# Patient Record
Sex: MALE | Race: WHITE | NOT HISPANIC OR LATINO | Employment: OTHER | URBAN - METROPOLITAN AREA
[De-identification: names, ages, dates, MRNs, and addresses within clinical notes are randomized per-mention and may not be internally consistent; named-entity substitution may affect disease eponyms.]

---

## 2023-04-06 LAB
ANION GAP IN SER/PLAS: 16 MMOL/L (ref 10–20)
CALCIUM (MG/DL) IN SER/PLAS: 9.5 MG/DL (ref 8.6–10.6)
CARBON DIOXIDE, TOTAL (MMOL/L) IN SER/PLAS: 26 MMOL/L (ref 21–32)
CHLORIDE (MMOL/L) IN SER/PLAS: 104 MMOL/L (ref 98–107)
CREATININE (MG/DL) IN SER/PLAS: 1.3 MG/DL (ref 0.5–1.3)
ESTIMATED AVERAGE GLUCOSE FOR HBA1C: 148 MG/DL
GFR MALE: 55 ML/MIN/1.73M2
GLUCOSE (MG/DL) IN SER/PLAS: 127 MG/DL (ref 74–99)
HEMOGLOBIN A1C/HEMOGLOBIN TOTAL IN BLOOD: 6.8 %
POTASSIUM (MMOL/L) IN SER/PLAS: 4.6 MMOL/L (ref 3.5–5.3)
PROSTATE SPECIFIC AG (NG/ML) IN SER/PLAS: 1.82 NG/ML (ref 0–4)
SODIUM (MMOL/L) IN SER/PLAS: 141 MMOL/L (ref 136–145)
UREA NITROGEN (MG/DL) IN SER/PLAS: 28 MG/DL (ref 6–23)

## 2023-08-24 PROBLEM — R35.1 NOCTURIA: Status: ACTIVE | Noted: 2023-08-24

## 2023-08-24 PROBLEM — I49.3 PVC'S (PREMATURE VENTRICULAR CONTRACTIONS): Status: ACTIVE | Noted: 2023-08-24

## 2023-08-24 PROBLEM — E11.9 DIABETES MELLITUS (MULTI): Status: ACTIVE | Noted: 2023-08-24

## 2023-08-24 PROBLEM — A49.9 BACTERIAL UTI: Status: ACTIVE | Noted: 2023-08-24

## 2023-08-24 PROBLEM — L70.8 FOLLICULAR ACNE: Status: ACTIVE | Noted: 2023-08-24

## 2023-08-24 PROBLEM — E03.9 ACQUIRED HYPOTHYROIDISM: Status: ACTIVE | Noted: 2023-08-24

## 2023-08-24 PROBLEM — I10 HYPERTENSION: Status: ACTIVE | Noted: 2023-08-24

## 2023-08-24 PROBLEM — L73.9 FOLLICULITIS: Status: ACTIVE | Noted: 2023-08-24

## 2023-08-24 PROBLEM — N40.1 BPH ASSOCIATED WITH NOCTURIA: Status: ACTIVE | Noted: 2023-08-24

## 2023-08-24 PROBLEM — E78.5 HYPERLIPIDEMIA: Status: ACTIVE | Noted: 2023-08-24

## 2023-08-24 PROBLEM — R00.2 HEART PALPITATIONS: Status: ACTIVE | Noted: 2023-08-24

## 2023-08-24 PROBLEM — R94.4 DECREASED GFR: Status: ACTIVE | Noted: 2023-08-24

## 2023-08-24 PROBLEM — I25.10 CAD IN NATIVE ARTERY: Status: ACTIVE | Noted: 2023-08-24

## 2023-08-24 PROBLEM — N39.0 BACTERIAL UTI: Status: ACTIVE | Noted: 2023-08-24

## 2023-08-24 PROBLEM — I48.91 ATRIAL FIBRILLATION (MULTI): Status: ACTIVE | Noted: 2023-08-24

## 2023-08-24 PROBLEM — K57.90 DIVERTICULOSIS: Status: ACTIVE | Noted: 2023-08-24

## 2023-08-24 PROBLEM — K92.2 LOWER GI BLEED: Status: ACTIVE | Noted: 2023-08-24

## 2023-08-24 PROBLEM — N41.1 CHRONIC PROSTATITIS: Status: ACTIVE | Noted: 2023-08-24

## 2023-08-24 PROBLEM — R35.1 BPH ASSOCIATED WITH NOCTURIA: Status: ACTIVE | Noted: 2023-08-24

## 2023-08-24 PROBLEM — L98.9 SKIN DISORDER: Status: ACTIVE | Noted: 2023-08-24

## 2023-08-24 PROBLEM — N41.0 ACUTE BACTERIAL PROSTATITIS: Status: ACTIVE | Noted: 2023-08-24

## 2023-08-24 PROBLEM — Z98.62 STATUS POST ANGIOPLASTY: Status: ACTIVE | Noted: 2023-08-24

## 2023-08-24 PROBLEM — I50.9 CHF (CONGESTIVE HEART FAILURE) (MULTI): Status: ACTIVE | Noted: 2023-08-24

## 2023-08-24 RX ORDER — TRIAMCINOLONE ACETONIDE 5 MG/G
CREAM TOPICAL
COMMUNITY
Start: 2013-09-04 | End: 2024-04-23 | Stop reason: SDUPTHER

## 2023-08-24 RX ORDER — DESONIDE 0.5 MG/G
CREAM TOPICAL 2 TIMES DAILY
COMMUNITY
Start: 2013-09-04 | End: 2023-10-05 | Stop reason: SDUPTHER

## 2023-08-24 RX ORDER — LEVOTHYROXINE SODIUM 25 UG/1
1 TABLET ORAL DAILY
COMMUNITY
Start: 2022-10-06 | End: 2024-05-07 | Stop reason: SDUPTHER

## 2023-08-24 RX ORDER — FUROSEMIDE 40 MG/1
1 TABLET ORAL DAILY
COMMUNITY
Start: 2021-02-26 | End: 2024-04-02 | Stop reason: SDUPTHER

## 2023-08-24 RX ORDER — NITROGLYCERIN 0.4 MG/1
TABLET SUBLINGUAL
COMMUNITY
Start: 2013-09-04 | End: 2024-05-08 | Stop reason: SDUPTHER

## 2023-08-24 RX ORDER — METOPROLOL TARTRATE 50 MG/1
1 TABLET ORAL 2 TIMES DAILY
COMMUNITY
Start: 2015-09-11 | End: 2024-04-02 | Stop reason: SDUPTHER

## 2023-08-24 RX ORDER — LOSARTAN POTASSIUM 50 MG/1
1 TABLET ORAL DAILY
COMMUNITY
Start: 2018-10-03 | End: 2024-04-02 | Stop reason: SDUPTHER

## 2023-08-24 RX ORDER — AZELASTINE 1 MG/ML
SPRAY, METERED NASAL
COMMUNITY
Start: 2013-09-04 | End: 2024-04-04 | Stop reason: SDUPTHER

## 2023-08-24 RX ORDER — CLOBETASOL PROPIONATE 0.5 MG/G
CREAM TOPICAL 2 TIMES DAILY
COMMUNITY
Start: 2019-04-03 | End: 2024-05-07 | Stop reason: SDUPTHER

## 2023-08-24 RX ORDER — CETIRIZINE HYDROCHLORIDE 10 MG/1
1 TABLET ORAL DAILY
COMMUNITY
Start: 2015-09-01

## 2023-08-24 RX ORDER — DAPAGLIFLOZIN 5 MG/1
1 TABLET, FILM COATED ORAL DAILY
COMMUNITY
Start: 2021-12-20 | End: 2024-04-04 | Stop reason: SDUPTHER

## 2023-08-24 RX ORDER — ATORVASTATIN CALCIUM 80 MG/1
1 TABLET, FILM COATED ORAL NIGHTLY
COMMUNITY
Start: 2013-09-04 | End: 2024-04-02 | Stop reason: SDUPTHER

## 2023-08-24 RX ORDER — CLINDAMYCIN PHOSPHATE 10 UG/ML
LOTION TOPICAL
COMMUNITY
Start: 2019-10-02

## 2023-08-24 RX ORDER — ASPIRIN 81 MG/1
1 TABLET ORAL DAILY
COMMUNITY
Start: 2014-06-10 | End: 2024-04-02 | Stop reason: SDUPTHER

## 2023-08-24 RX ORDER — NEOMYCIN SULFATE, POLYMYXIN B SULFATE, HYDROCORTISONE 3.5; 10000; 1 MG/ML; [USP'U]/ML; MG/ML
SOLUTION/ DROPS AURICULAR (OTIC)
COMMUNITY
Start: 2020-07-27 | End: 2024-04-04 | Stop reason: SDUPTHER

## 2023-08-24 RX ORDER — ERYTHROMYCIN 20 MG/ML
SOLUTION TOPICAL 2 TIMES DAILY
COMMUNITY
Start: 2019-04-03

## 2023-08-24 RX ORDER — POTASSIUM CHLORIDE 20 MEQ/1
1 TABLET, EXTENDED RELEASE ORAL DAILY
COMMUNITY
Start: 2020-09-30 | End: 2024-04-02 | Stop reason: SDUPTHER

## 2023-08-24 RX ORDER — TAMSULOSIN HYDROCHLORIDE 0.4 MG/1
1 CAPSULE ORAL DAILY
COMMUNITY
Start: 2015-02-20 | End: 2024-04-23 | Stop reason: SDUPTHER

## 2023-08-24 RX ORDER — VIT A/VIT C/VIT E/ZINC/COPPER 2148-113
1 TABLET ORAL 2 TIMES DAILY
COMMUNITY
Start: 2020-03-05

## 2023-08-24 RX ORDER — METFORMIN HYDROCHLORIDE 500 MG/1
TABLET, EXTENDED RELEASE ORAL
COMMUNITY
Start: 2017-04-19 | End: 2024-04-04 | Stop reason: SDUPTHER

## 2023-08-24 RX ORDER — ADAPALENE 0.1 G/100G
CREAM TOPICAL
COMMUNITY
Start: 2019-10-02 | End: 2024-05-07 | Stop reason: SDUPTHER

## 2023-08-24 RX ORDER — CLOTRIMAZOLE AND BETAMETHASONE DIPROPIONATE 10; .64 MG/G; MG/G
CREAM TOPICAL
COMMUNITY
Start: 2013-09-04 | End: 2024-04-04 | Stop reason: SDUPTHER

## 2023-09-29 LAB
ALANINE AMINOTRANSFERASE (SGPT) (U/L) IN SER/PLAS: 22 U/L (ref 10–52)
ALBUMIN (G/DL) IN SER/PLAS: 4 G/DL (ref 3.4–5)
ALKALINE PHOSPHATASE (U/L) IN SER/PLAS: 80 U/L (ref 33–136)
ANION GAP IN SER/PLAS: 11 MMOL/L (ref 10–20)
ASPARTATE AMINOTRANSFERASE (SGOT) (U/L) IN SER/PLAS: 18 U/L (ref 9–39)
BILIRUBIN TOTAL (MG/DL) IN SER/PLAS: 0.8 MG/DL (ref 0–1.2)
CALCIUM (MG/DL) IN SER/PLAS: 9.4 MG/DL (ref 8.6–10.3)
CARBON DIOXIDE, TOTAL (MMOL/L) IN SER/PLAS: 30 MMOL/L (ref 21–32)
CHLORIDE (MMOL/L) IN SER/PLAS: 103 MMOL/L (ref 98–107)
CHOLESTEROL (MG/DL) IN SER/PLAS: 135 MG/DL (ref 0–199)
CHOLESTEROL IN HDL (MG/DL) IN SER/PLAS: 50.5 MG/DL
CHOLESTEROL/HDL RATIO: 2.7
CREATININE (MG/DL) IN SER/PLAS: 1.22 MG/DL (ref 0.5–1.3)
ERYTHROCYTE DISTRIBUTION WIDTH (RATIO) BY AUTOMATED COUNT: 13.4 % (ref 11.5–14.5)
ERYTHROCYTE MEAN CORPUSCULAR HEMOGLOBIN CONCENTRATION (G/DL) BY AUTOMATED: 32.9 G/DL (ref 32–36)
ERYTHROCYTE MEAN CORPUSCULAR VOLUME (FL) BY AUTOMATED COUNT: 99 FL (ref 80–100)
ERYTHROCYTES (10*6/UL) IN BLOOD BY AUTOMATED COUNT: 4.87 X10E12/L (ref 4.5–5.9)
GFR MALE: 59 ML/MIN/1.73M2
GLUCOSE (MG/DL) IN SER/PLAS: 128 MG/DL (ref 74–99)
HEMATOCRIT (%) IN BLOOD BY AUTOMATED COUNT: 48.3 % (ref 41–52)
HEMOGLOBIN (G/DL) IN BLOOD: 15.9 G/DL (ref 13.5–17.5)
LDL: 54 MG/DL (ref 0–99)
LEUKOCYTES (10*3/UL) IN BLOOD BY AUTOMATED COUNT: 6.9 X10E9/L (ref 4.4–11.3)
PLATELETS (10*3/UL) IN BLOOD AUTOMATED COUNT: 266 X10E9/L (ref 150–450)
POTASSIUM (MMOL/L) IN SER/PLAS: 4 MMOL/L (ref 3.5–5.3)
PROTEIN TOTAL: 7.1 G/DL (ref 6.4–8.2)
SODIUM (MMOL/L) IN SER/PLAS: 140 MMOL/L (ref 136–145)
THYROTROPIN (MIU/L) IN SER/PLAS BY DETECTION LIMIT <= 0.05 MIU/L: 2.46 MIU/L (ref 0.44–3.98)
TRIGLYCERIDE (MG/DL) IN SER/PLAS: 153 MG/DL (ref 0–149)
UREA NITROGEN (MG/DL) IN SER/PLAS: 23 MG/DL (ref 6–23)
VLDL: 31 MG/DL (ref 0–40)

## 2023-09-30 LAB
ESTIMATED AVERAGE GLUCOSE FOR HBA1C: 143 MG/DL
HEMOGLOBIN A1C/HEMOGLOBIN TOTAL IN BLOOD: 6.6 %

## 2023-10-03 ENCOUNTER — OFFICE VISIT (OUTPATIENT)
Dept: CARDIOLOGY | Facility: HOSPITAL | Age: 83
End: 2023-10-03
Payer: MEDICARE

## 2023-10-03 VITALS
HEIGHT: 73 IN | BODY MASS INDEX: 31.28 KG/M2 | SYSTOLIC BLOOD PRESSURE: 105 MMHG | WEIGHT: 236 LBS | DIASTOLIC BLOOD PRESSURE: 66 MMHG | HEART RATE: 60 BPM

## 2023-10-03 DIAGNOSIS — I48.91 ATRIAL FIBRILLATION, UNSPECIFIED TYPE (MULTI): Primary | ICD-10-CM

## 2023-10-03 DIAGNOSIS — I50.32 CHRONIC DIASTOLIC CONGESTIVE HEART FAILURE (MULTI): ICD-10-CM

## 2023-10-03 DIAGNOSIS — E11.9 TYPE 2 DIABETES MELLITUS WITHOUT COMPLICATION, WITHOUT LONG-TERM CURRENT USE OF INSULIN (MULTI): ICD-10-CM

## 2023-10-03 DIAGNOSIS — N40.1 BPH ASSOCIATED WITH NOCTURIA: ICD-10-CM

## 2023-10-03 DIAGNOSIS — I25.10 CAD IN NATIVE ARTERY: ICD-10-CM

## 2023-10-03 DIAGNOSIS — R35.1 BPH ASSOCIATED WITH NOCTURIA: ICD-10-CM

## 2023-10-03 DIAGNOSIS — E78.00 PURE HYPERCHOLESTEROLEMIA: ICD-10-CM

## 2023-10-03 DIAGNOSIS — I10 PRIMARY HYPERTENSION: ICD-10-CM

## 2023-10-03 PROCEDURE — 93005 ELECTROCARDIOGRAM TRACING: CPT | Performed by: INTERNAL MEDICINE

## 2023-10-03 PROCEDURE — 3074F SYST BP LT 130 MM HG: CPT | Performed by: INTERNAL MEDICINE

## 2023-10-03 PROCEDURE — 99214 OFFICE O/P EST MOD 30 MIN: CPT | Performed by: INTERNAL MEDICINE

## 2023-10-03 PROCEDURE — 3078F DIAST BP <80 MM HG: CPT | Performed by: INTERNAL MEDICINE

## 2023-10-03 PROCEDURE — 1036F TOBACCO NON-USER: CPT | Performed by: INTERNAL MEDICINE

## 2023-10-03 PROCEDURE — 1159F MED LIST DOCD IN RCRD: CPT | Performed by: INTERNAL MEDICINE

## 2023-10-03 PROCEDURE — 1160F RVW MEDS BY RX/DR IN RCRD: CPT | Performed by: INTERNAL MEDICINE

## 2023-10-03 ASSESSMENT — ENCOUNTER SYMPTOMS
LOSS OF SENSATION IN FEET: 0
DEPRESSION: 0
OCCASIONAL FEELINGS OF UNSTEADINESS: 1

## 2023-10-03 ASSESSMENT — PATIENT HEALTH QUESTIONNAIRE - PHQ9
2. FEELING DOWN, DEPRESSED OR HOPELESS: NOT AT ALL
1. LITTLE INTEREST OR PLEASURE IN DOING THINGS: NOT AT ALL
SUM OF ALL RESPONSES TO PHQ9 QUESTIONS 1 AND 2: 0

## 2023-10-03 NOTE — PROGRESS NOTES
"Chief Complaint:   Hyperlipidemia, Heart Failure, Coronary Artery Disease, Atrial Fibrillation, and Fatigue     History Of Present Illness:    Stan Carrero is a 83 y.o. male presents for follow-up.  He has no particular complaints.  He is able to walk up 1 flight of stairs and do yard work without chest pain or shortness of breath.  The patient denies chest pain, shortness of breath, palpitations, lightheadedness, syncope, orthopnea, paroxysmal nocturnal dyspnea, lower extremity edema, or bleeding problems.     Last Recorded Vitals:  Vitals:    10/03/23 1403   BP: 105/66   BP Location: Left arm   Patient Position: Sitting   BP Cuff Size: Adult   Pulse: 60   Weight: 107 kg (236 lb)   Height: 1.854 m (6' 1\")       Past Medical History:  He has a past medical history of Chronic sinusitis, unspecified, Other conditions influencing health status, and Personal history of other diseases of urinary system.    Past Surgical History:  He has a past surgical history that includes Appendectomy (06/17/2013); Other surgical history (09/04/2013); and Cataract extraction (09/04/2013).      Social History:  He reports that he has never smoked. He has never used smokeless tobacco. No history on file for alcohol use and drug use.    Family History:  Family History   Problem Relation Name Age of Onset    Alzheimer's disease Mother      COPD Father          Allergies:  Lisinopril and Shellfish derived    Outpatient Medications:  Current Outpatient Medications   Medication Instructions    adapalene (Differin) 0.1 % cream Topical, Daily RT    apixaban (Eliquis) 5 mg tablet 1 tablet, oral, 2 times daily    aspirin 81 mg EC tablet 1 tablet, oral, Daily    atorvastatin (Lipitor) 80 mg tablet 1 tablet, oral, Nightly    azelastine (Astelin) 137 mcg (0.1 %) nasal spray nasal    carboxymethylcellulose PF 1 % ophthalmic solution ophthalmic (eye)    cetirizine (ZyrTEC) 10 mg tablet 1 tablet, oral, Daily    clindamycin (Cleocin T) 1 % lotion " APPLY SPARINGLY AND MASSAGE IN AS DIRECTED    clobetasol (Temovate) 0.05 % cream 2 times daily    clotrimazole-betamethasone (Lotrisone) cream apply twice a day as directed    dapagliflozin propanediol (Farxiga) 5 mg 1 tablet, oral, Daily    desonide (DesOwen) 0.05 % cream 2 times daily    erythromycin with Ethanol (Theramycin) 2 % external solution 2 times daily    furosemide (Lasix) 40 mg tablet 1 tablet, oral, Daily    levothyroxine (Synthroid, Levoxyl) 25 mcg tablet 1 tablet, oral, Daily    losartan (Cozaar) 50 mg tablet 1 tablet, oral, Daily    metFORMIN XR (Glucophage-XR) 500 mg 24 hr tablet oral    metoprolol tartrate (Lopressor) 50 mg tablet 1 tablet, oral, 2 times daily    neomycin-polymyxin-HC (Cortisporin) otic solution otic (ear)    nitroglycerin (Nitrostat) 0.4 mg SL tablet sublingual    potassium chloride CR (Klor-Con M20) 20 mEq ER tablet 1 tablet, oral, Daily    tamsulosin (Flomax) 0.4 mg 24 hr capsule 1 capsule, oral, Daily    triamcinolone (Kenalog) 0.5 % cream USE AS DIRECTED    vitamins A,C,E-zinc-copper (PreserVision AREDS) 2,148 mcg-113 mg-45 mg-17.4mg tablet 1 tablet, oral, 2 times daily       Physical Exam:  General: Well-developed well-nourished in no acute distress  HEENT: Normocephalic atraumatic  Neck: Supple, JVP is normal negative hepatojugular reflux 2+ carotid pulses without bruit  Pulmonary: Normal respiratory effort, clear to auscultation  Cardiovascular: No right ventricular heave, normal S1 and S2, no murmurs rubs or gallops  Abdomen: Soft nontender nondistended  Extremities: Warm with trace bilateral ankle edema 2+ radial pulses bilaterally 2+ posterior tibial pulses bilaterally  Neurologic: Alert and oriented x3  Psychiatric: Normal mood and affect     Last Labs:  CBC -  Lab Results   Component Value Date    WBC 6.9 09/29/2023    HGB 15.9 09/29/2023    HCT 48.3 09/29/2023    MCV 99 09/29/2023     09/29/2023       CMP -  Lab Results   Component Value Date    CALCIUM 9.4  09/29/2023    PROT 7.1 09/29/2023    ALBUMIN 4.0 09/29/2023    AST 18 09/29/2023    ALT 22 09/29/2023    ALKPHOS 80 09/29/2023    BILITOT 0.8 09/29/2023       LIPID PANEL -   Lab Results   Component Value Date    CHOL 135 09/29/2023    TRIG 153 (H) 09/29/2023    HDL 50.5 09/29/2023    CHHDL 2.7 09/29/2023    LDLF 54 09/29/2023    VLDL 31 09/29/2023       RENAL FUNCTION PANEL -   Lab Results   Component Value Date    GLUCOSE 128 (H) 09/29/2023     09/29/2023    K 4.0 09/29/2023     09/29/2023    CO2 30 09/29/2023    ANIONGAP 11 09/29/2023    BUN 23 09/29/2023    CREATININE 1.22 09/29/2023    GFRMALE 59 (A) 09/29/2023    CALCIUM 9.4 09/29/2023    ALBUMIN 4.0 09/29/2023        Lab Results   Component Value Date    HGBA1C 6.6 (A) 09/29/2023       Last Cardiology Tests:  ECG:  An electrocardiogram performed today that I reviewed shows atrial fibrillation nonspecific T wave abnormality.  Echo:  Echocardiogram from October 2, 2020  CONCLUSIONS:   1. The left ventricular systolic function is normal with a 55-60% estimated ejection fraction.   2. Spectral Doppler shows an abnormal pattern of left ventricular diastolic filling.   3. There is mild to moderate tricuspid regurgitation.   4. The patient is in atrial fibrillation which may influence the estimate of left ventricular function and transvalvular flows.    CT of the chest May 18, 2023.  FINDINGS:  POTENTIAL LIMITATIONS OF THE STUDY:  The study is limited by the lack  of intravenous contrast.     CHEST WALL AND LOWER NECK:  Evaluation of the visualized neck base  demonstrates no gross mass or  lymphadenopathy.     MEDIASTINUM AND RAPHAEL:  There is no gross axillary or mediastinal lymphadenopathy identified.  Evaluation of the raphael is limited by the lack of intravenous contrast.     HEART:  The heart is within normal limits for size.  No pericardial effusion  is identified.  Moderate to severe atherosclerotic calcifications are  seen in the coronary arteries.  Mild aortic valve calcifications are  present.     VESSELS:  Scattered atherosclerotic calcifications are seen in the aortic arch  and descending thoracic aorta, including at the origins of the arch  vessels.  The thoracic aorta is within normal limits for course and  caliber, without evidence of aneurysm.     LUNG/PLEURA/LARGE AIRWAYS:  Mild scarring and/or atelectasis is seen at the lung bases  bilaterally. There is no focal infiltrate, pleural effusion or  pneumothorax identified.  No discrete pulmonary nodules or masses are  seen.     UPPER ABDOMEN:  Evaluation of the visualized upper abdomen demonstrates no  discrete  mass or lymphadenopathy.     OSSEOUS STRUCTURES:  There is no evidence of acute fracture identified.  Multilevel  degenerative changes are seen in the thoracic spine.     IMPRESSION:  1.  No focal infiltrate, pulmonary nodule or lymphadenopathy  identified.      Assessment/Plan   Diagnoses and all orders for this visit:  Atrial fibrillation, unspecified type (CMS/HCC)  CAD in native artery  Primary hypertension  Pure hypercholesterolemia  Chronic diastolic congestive heart failure (CMS/HCC)    In summary Mr. Carrero is a pleasant 83 year-old white male with a past medical history significant for coronary artery disease status post bare-metal stent placement to his LAD in 2001 and subsequent drug-eluting stent placement to his LAD in 2015 with preserved left ventricular function, hypertension, hyperlipidemia, and type II non-insulin-requiring diabetes, persistent atrial fibrillation on anticoagulation, chronic diastolic heart failure, and frequent PVCs with nonsustained VT, and history of prior diverticular GI bleed.  He is asymptomatic from a cardiac perspective.  He appears euvolemic on exam.  His blood pressure and lipid profile are at goal.  He should continue his current cardiovascular medications.  We will be checking labs as ordered in 6 months.  I will see him back in follow-up in 6  months.    Stan Long MD

## 2023-10-04 LAB
ATRIAL RATE: 120 BPM
Q ONSET: 222 MS
QRS COUNT: 9 BEATS
QRS DURATION: 90 MS
QT INTERVAL: 416 MS
QTC CALCULATION(BAZETT): 416 MS
QTC FREDERICIA: 416 MS
R AXIS: 18 DEGREES
T AXIS: 34 DEGREES
T OFFSET: 430 MS
VENTRICULAR RATE: 60 BPM

## 2023-10-04 PROCEDURE — 93010 ELECTROCARDIOGRAM REPORT: CPT | Performed by: INTERNAL MEDICINE

## 2023-10-05 ENCOUNTER — OFFICE VISIT (OUTPATIENT)
Dept: PRIMARY CARE | Facility: CLINIC | Age: 83
End: 2023-10-05
Payer: MEDICARE

## 2023-10-05 VITALS
SYSTOLIC BLOOD PRESSURE: 122 MMHG | BODY MASS INDEX: 31.82 KG/M2 | TEMPERATURE: 97.1 F | OXYGEN SATURATION: 98 % | HEART RATE: 60 BPM | HEIGHT: 73 IN | DIASTOLIC BLOOD PRESSURE: 80 MMHG | RESPIRATION RATE: 16 BRPM | WEIGHT: 240.08 LBS

## 2023-10-05 DIAGNOSIS — I50.82 BIVENTRICULAR CONGESTIVE HEART FAILURE (MULTI): ICD-10-CM

## 2023-10-05 DIAGNOSIS — L98.9 SKIN DISORDER: ICD-10-CM

## 2023-10-05 DIAGNOSIS — L73.9 FOLLICULITIS: ICD-10-CM

## 2023-10-05 DIAGNOSIS — E11.22 TYPE 2 DIABETES MELLITUS WITH STAGE 2 CHRONIC KIDNEY DISEASE, WITHOUT LONG-TERM CURRENT USE OF INSULIN (MULTI): ICD-10-CM

## 2023-10-05 DIAGNOSIS — N18.2 TYPE 2 DIABETES MELLITUS WITH STAGE 2 CHRONIC KIDNEY DISEASE, WITHOUT LONG-TERM CURRENT USE OF INSULIN (MULTI): ICD-10-CM

## 2023-10-05 DIAGNOSIS — L30.9 ECZEMA, UNSPECIFIED TYPE: Primary | ICD-10-CM

## 2023-10-05 PROCEDURE — 3079F DIAST BP 80-89 MM HG: CPT | Performed by: INTERNAL MEDICINE

## 2023-10-05 PROCEDURE — 1160F RVW MEDS BY RX/DR IN RCRD: CPT | Performed by: INTERNAL MEDICINE

## 2023-10-05 PROCEDURE — 1159F MED LIST DOCD IN RCRD: CPT | Performed by: INTERNAL MEDICINE

## 2023-10-05 PROCEDURE — 99214 OFFICE O/P EST MOD 30 MIN: CPT | Performed by: INTERNAL MEDICINE

## 2023-10-05 PROCEDURE — 3074F SYST BP LT 130 MM HG: CPT | Performed by: INTERNAL MEDICINE

## 2023-10-05 PROCEDURE — 1036F TOBACCO NON-USER: CPT | Performed by: INTERNAL MEDICINE

## 2023-10-05 RX ORDER — HYDROCORTISONE 1 %
CREAM (GRAM) TOPICAL ONCE
Status: SHIPPED | OUTPATIENT
Start: 2023-10-05

## 2023-10-05 RX ORDER — DESONIDE 0.5 MG/G
CREAM TOPICAL 2 TIMES DAILY
Qty: 60 G | Refills: 3 | Status: SHIPPED | OUTPATIENT
Start: 2023-10-05 | End: 2024-04-04 | Stop reason: SDUPTHER

## 2023-10-05 ASSESSMENT — ENCOUNTER SYMPTOMS
OCCASIONAL FEELINGS OF UNSTEADINESS: 0
DEPRESSION: 0
LOSS OF SENSATION IN FEET: 0

## 2023-10-05 NOTE — PROGRESS NOTES
"Subjective   Patient ID: Stan Carrero is a 83 y.o. male who presents for No chief complaint on file..    HPI 83-year-old male here for follow-up type 2 diabetes coronary artery disease A-fib CHF hyperlipidemia hypertension  Patient remains active no formal exercise    Review of Systems  Cardiovascular some shortness of breath worse when he is bending over no increased ankle swelling no chest pain  GI negative   negative  Endocrine glucose 1 30-1 80 trying to follow a diabetic diet  Objective   /80 (BP Location: Right arm, Patient Position: Sitting)   Pulse 60   Temp 36.2 °C (97.1 °F)   Resp 16   Ht 1.854 m (6' 1\")   Wt 109 kg (240 lb 1.3 oz)   SpO2 98%   BMI 31.67 kg/m²     Physical Exam  Cardiovascular:      Rate and Rhythm: Rhythm irregular.      Heart sounds: Murmur heard.      No gallop.   Pulmonary:      Breath sounds: No rales.   Abdominal:      General: Abdomen is flat. Bowel sounds are normal.      Palpations: Abdomen is soft.      Tenderness: There is no abdominal tenderness.   Neurological:      General: No focal deficit present.      Mental Status: He is alert and oriented to person, place, and time.     Diabetic foot exam pulses are 1+ sensation is intact no open lesions    Assessment/Plan   Diagnoses and all orders for this visit:  Eczema, unspecified type  -     desonide (DesOwen) 0.05 % cream; Apply topically 2 times a day.  -     hydrocortisone 1 % cream  Biventricular congestive heart failure (CMS/HCC)  Folliculitis  Skin disorder  Type 2 diabetes mellitus with stage 2 chronic kidney disease, without long-term current use of insulin (CMS/HCC)  CKD stage II improving  Farxiga patient is due for UA and urine albumin follow-up 6 months  Immunization high-dose flu shot patient prefers no COVID     "

## 2024-02-06 ENCOUNTER — APPOINTMENT (OUTPATIENT)
Dept: RADIOLOGY | Facility: HOSPITAL | Age: 84
End: 2024-02-06
Payer: MEDICARE

## 2024-02-06 ENCOUNTER — APPOINTMENT (OUTPATIENT)
Dept: CARDIOLOGY | Facility: HOSPITAL | Age: 84
End: 2024-02-06
Payer: MEDICARE

## 2024-02-06 ENCOUNTER — HOSPITAL ENCOUNTER (EMERGENCY)
Facility: HOSPITAL | Age: 84
Discharge: LEFT BEFORE DISCHARGE | End: 2024-02-06
Attending: FAMILY MEDICINE
Payer: MEDICARE

## 2024-02-06 VITALS
HEIGHT: 73 IN | RESPIRATION RATE: 16 BRPM | HEART RATE: 65 BPM | WEIGHT: 237 LBS | BODY MASS INDEX: 31.41 KG/M2 | DIASTOLIC BLOOD PRESSURE: 98 MMHG | OXYGEN SATURATION: 100 % | SYSTOLIC BLOOD PRESSURE: 161 MMHG | TEMPERATURE: 96.5 F

## 2024-02-06 DIAGNOSIS — M54.6 CHRONIC LEFT-SIDED THORACIC BACK PAIN: Primary | ICD-10-CM

## 2024-02-06 DIAGNOSIS — G89.29 CHRONIC LEFT-SIDED THORACIC BACK PAIN: Primary | ICD-10-CM

## 2024-02-06 DIAGNOSIS — Z53.21 ELOPED FROM EMERGENCY DEPARTMENT: ICD-10-CM

## 2024-02-06 LAB
ALBUMIN SERPL BCP-MCNC: 3.9 G/DL (ref 3.4–5)
ALP SERPL-CCNC: 71 U/L (ref 33–136)
ALT SERPL W P-5'-P-CCNC: 19 U/L (ref 10–52)
ANION GAP SERPL CALC-SCNC: 11 MMOL/L (ref 10–20)
AST SERPL W P-5'-P-CCNC: 17 U/L (ref 9–39)
BASOPHILS # BLD AUTO: 0.03 X10*3/UL (ref 0–0.1)
BASOPHILS NFR BLD AUTO: 0.5 %
BILIRUB SERPL-MCNC: 0.9 MG/DL (ref 0–1.2)
BUN SERPL-MCNC: 23 MG/DL (ref 6–23)
CALCIUM SERPL-MCNC: 9 MG/DL (ref 8.6–10.3)
CARDIAC TROPONIN I PNL SERPL HS: 6 NG/L (ref 0–20)
CHLORIDE SERPL-SCNC: 104 MMOL/L (ref 98–107)
CO2 SERPL-SCNC: 28 MMOL/L (ref 21–32)
CREAT SERPL-MCNC: 1.01 MG/DL (ref 0.5–1.3)
EGFRCR SERPLBLD CKD-EPI 2021: 74 ML/MIN/1.73M*2
EOSINOPHIL # BLD AUTO: 0.35 X10*3/UL (ref 0–0.4)
EOSINOPHIL NFR BLD AUTO: 6.1 %
ERYTHROCYTE [DISTWIDTH] IN BLOOD BY AUTOMATED COUNT: 13.2 % (ref 11.5–14.5)
GLUCOSE SERPL-MCNC: 146 MG/DL (ref 74–99)
HCT VFR BLD AUTO: 47.8 % (ref 41–52)
HGB BLD-MCNC: 15.7 G/DL (ref 13.5–17.5)
HOLD SPECIMEN: NORMAL
IMM GRANULOCYTES # BLD AUTO: 0.02 X10*3/UL (ref 0–0.5)
IMM GRANULOCYTES NFR BLD AUTO: 0.3 % (ref 0–0.9)
LYMPHOCYTES # BLD AUTO: 0.92 X10*3/UL (ref 0.8–3)
LYMPHOCYTES NFR BLD AUTO: 15.9 %
MCH RBC QN AUTO: 32.2 PG (ref 26–34)
MCHC RBC AUTO-ENTMCNC: 32.8 G/DL (ref 32–36)
MCV RBC AUTO: 98 FL (ref 80–100)
MONOCYTES # BLD AUTO: 0.55 X10*3/UL (ref 0.05–0.8)
MONOCYTES NFR BLD AUTO: 9.5 %
NEUTROPHILS # BLD AUTO: 3.91 X10*3/UL (ref 1.6–5.5)
NEUTROPHILS NFR BLD AUTO: 67.7 %
NRBC BLD-RTO: 0 /100 WBCS (ref 0–0)
PLATELET # BLD AUTO: 253 X10*3/UL (ref 150–450)
POTASSIUM SERPL-SCNC: 4.1 MMOL/L (ref 3.5–5.3)
PROT SERPL-MCNC: 6.2 G/DL (ref 6.4–8.2)
RBC # BLD AUTO: 4.88 X10*6/UL (ref 4.5–5.9)
SODIUM SERPL-SCNC: 139 MMOL/L (ref 136–145)
WBC # BLD AUTO: 5.8 X10*3/UL (ref 4.4–11.3)

## 2024-02-06 PROCEDURE — 85025 COMPLETE CBC W/AUTO DIFF WBC: CPT | Performed by: FAMILY MEDICINE

## 2024-02-06 PROCEDURE — 2500000004 HC RX 250 GENERAL PHARMACY W/ HCPCS (ALT 636 FOR OP/ED): Performed by: FAMILY MEDICINE

## 2024-02-06 PROCEDURE — 71046 X-RAY EXAM CHEST 2 VIEWS: CPT | Performed by: RADIOLOGY

## 2024-02-06 PROCEDURE — 96374 THER/PROPH/DIAG INJ IV PUSH: CPT

## 2024-02-06 PROCEDURE — 84484 ASSAY OF TROPONIN QUANT: CPT | Performed by: FAMILY MEDICINE

## 2024-02-06 PROCEDURE — 96375 TX/PRO/DX INJ NEW DRUG ADDON: CPT

## 2024-02-06 PROCEDURE — 71046 X-RAY EXAM CHEST 2 VIEWS: CPT

## 2024-02-06 PROCEDURE — 72072 X-RAY EXAM THORAC SPINE 3VWS: CPT

## 2024-02-06 PROCEDURE — 99284 EMERGENCY DEPT VISIT MOD MDM: CPT | Mod: 25 | Performed by: FAMILY MEDICINE

## 2024-02-06 PROCEDURE — 93005 ELECTROCARDIOGRAM TRACING: CPT

## 2024-02-06 PROCEDURE — 72072 X-RAY EXAM THORAC SPINE 3VWS: CPT | Performed by: RADIOLOGY

## 2024-02-06 PROCEDURE — 84075 ASSAY ALKALINE PHOSPHATASE: CPT | Performed by: FAMILY MEDICINE

## 2024-02-06 PROCEDURE — 36415 COLL VENOUS BLD VENIPUNCTURE: CPT | Performed by: FAMILY MEDICINE

## 2024-02-06 RX ORDER — LIDOCAINE 560 MG/1
1 PATCH PERCUTANEOUS; TOPICAL; TRANSDERMAL ONCE
Status: DISCONTINUED | OUTPATIENT
Start: 2024-02-06 | End: 2024-02-06 | Stop reason: HOSPADM

## 2024-02-06 RX ORDER — LIDOCAINE 50 MG/G
1 PATCH TOPICAL DAILY
Qty: 10 PATCH | Refills: 0 | Status: SHIPPED | OUTPATIENT
Start: 2024-02-06

## 2024-02-06 RX ORDER — TIZANIDINE HYDROCHLORIDE 4 MG/1
4 CAPSULE, GELATIN COATED ORAL 3 TIMES DAILY PRN
Qty: 20 CAPSULE | Refills: 0 | Status: SHIPPED | OUTPATIENT
Start: 2024-02-06 | End: 2024-02-13

## 2024-02-06 RX ORDER — METHYLPREDNISOLONE 4 MG/1
TABLET ORAL
Qty: 21 TABLET | Refills: 0 | Status: SHIPPED | OUTPATIENT
Start: 2024-02-06 | End: 2024-02-06 | Stop reason: SDUPTHER

## 2024-02-06 RX ORDER — KETOROLAC TROMETHAMINE 15 MG/ML
7.5 INJECTION, SOLUTION INTRAMUSCULAR; INTRAVENOUS ONCE
Status: COMPLETED | OUTPATIENT
Start: 2024-02-06 | End: 2024-02-06

## 2024-02-06 RX ORDER — METHYLPREDNISOLONE 4 MG/1
TABLET ORAL
Qty: 21 TABLET | Refills: 0 | Status: SHIPPED | OUTPATIENT
Start: 2024-02-06 | End: 2024-02-08 | Stop reason: SDUPTHER

## 2024-02-06 RX ORDER — ORPHENADRINE CITRATE 30 MG/ML
60 INJECTION INTRAMUSCULAR; INTRAVENOUS ONCE
Status: COMPLETED | OUTPATIENT
Start: 2024-02-06 | End: 2024-02-06

## 2024-02-06 RX ORDER — DICLOFENAC SODIUM 10 MG/G
2 GEL TOPICAL 3 TIMES DAILY PRN
Qty: 50 G | Refills: 0 | Status: SHIPPED | OUTPATIENT
Start: 2024-02-06

## 2024-02-06 RX ORDER — MORPHINE SULFATE 4 MG/ML
4 INJECTION INTRAVENOUS ONCE
Status: COMPLETED | OUTPATIENT
Start: 2024-02-06 | End: 2024-02-06

## 2024-02-06 RX ADMIN — KETOROLAC TROMETHAMINE 7.5 MG: 15 INJECTION, SOLUTION INTRAMUSCULAR; INTRAVENOUS at 15:08

## 2024-02-06 RX ADMIN — MORPHINE SULFATE 4 MG: 4 INJECTION, SOLUTION INTRAMUSCULAR; INTRAVENOUS at 18:22

## 2024-02-06 RX ADMIN — ORPHENADRINE CITRATE 60 MG: 60 INJECTION INTRAMUSCULAR; INTRAVENOUS at 15:07

## 2024-02-06 ASSESSMENT — PAIN DESCRIPTION - ORIENTATION: ORIENTATION: MID

## 2024-02-06 ASSESSMENT — COLUMBIA-SUICIDE SEVERITY RATING SCALE - C-SSRS
6. HAVE YOU EVER DONE ANYTHING, STARTED TO DO ANYTHING, OR PREPARED TO DO ANYTHING TO END YOUR LIFE?: NO
1. IN THE PAST MONTH, HAVE YOU WISHED YOU WERE DEAD OR WISHED YOU COULD GO TO SLEEP AND NOT WAKE UP?: NO
2. HAVE YOU ACTUALLY HAD ANY THOUGHTS OF KILLING YOURSELF?: NO

## 2024-02-06 ASSESSMENT — PAIN - FUNCTIONAL ASSESSMENT: PAIN_FUNCTIONAL_ASSESSMENT: 0-10

## 2024-02-06 ASSESSMENT — PAIN SCALES - GENERAL: PAINLEVEL_OUTOF10: 8

## 2024-02-06 ASSESSMENT — PAIN DESCRIPTION - PAIN TYPE: TYPE: CHRONIC PAIN

## 2024-02-06 ASSESSMENT — PAIN DESCRIPTION - LOCATION: LOCATION: BACK

## 2024-02-06 ASSESSMENT — PAIN DESCRIPTION - DESCRIPTORS: DESCRIPTORS: THROBBING

## 2024-02-07 LAB
ATRIAL RATE: 394 BPM
Q ONSET: 223 MS
QRS COUNT: 9 BEATS
QRS DURATION: 86 MS
QT INTERVAL: 416 MS
QTC CALCULATION(BAZETT): 411 MS
QTC FREDERICIA: 413 MS
R AXIS: 16 DEGREES
T AXIS: 58 DEGREES
T OFFSET: 431 MS
VENTRICULAR RATE: 59 BPM

## 2024-02-07 NOTE — ED PROVIDER NOTES
HPI   Chief Complaint   Patient presents with    Back Pain       83-year-old male comes the ED with complaint of left-sided upper back pain has had for the last 2 months.  Patient reports it continued to persist tonight he had difficulty sleeping and getting comfortable which prompted a visit to the ED.  Patient reports during this time he is not sought evaluation and assessment but attempted to resolve the discomfort with aspirin and heating pad.  Patient reports it did very little to control discomfort and he notes certain ways he moves or bends aggravates the pain.  Patient reports is not sure what he may have done to cause the pain but is finally fed up with it and came to the ED for evaluation.  Patient reports no other associate symptoms or complaints.  Patient in the ED is alert, cooperative, peers anxious, uncomfortable, but in no distress.  Patient currently denies headache, neck pain, chest pain, abdominal pain, shortness of breath, fevers, falls, recent travel, sick contacts, nausea/vomiting/diarrhea, dysuria, dizziness, and weakness.      History provided by:  Patient   used: No                        White Post Coma Scale Score: 15                     Patient History   Past Medical History:   Diagnosis Date    Chronic sinusitis, unspecified     Chronic sinusitis    Other conditions influencing health status     Absence Of One Kidney    Personal history of other diseases of urinary system     History of pyelonephritis     Past Surgical History:   Procedure Laterality Date    APPENDECTOMY  06/17/2013    Appendectomy    CATARACT EXTRACTION  09/04/2013    Cataract Surgery    OTHER SURGICAL HISTORY  09/04/2013    Previous Stent Placement     Family History   Problem Relation Name Age of Onset    Alzheimer's disease Mother      COPD Father       Social History     Tobacco Use    Smoking status: Never    Smokeless tobacco: Never   Substance Use Topics    Alcohol use: Never    Drug use: Never        Physical Exam   ED Triage Vitals [02/06/24 1354]   Temperature Heart Rate Respirations BP   35.8 °C (96.5 °F) 65 16 (!) 161/98      SpO2 Temp Source Heart Rate Source Patient Position   100 % Temporal Monitor Lying      BP Location FiO2 (%)     Right arm --       Physical Exam  Vitals and nursing note reviewed.   Constitutional:       General: He is not in acute distress.     Appearance: He is well-developed.   HENT:      Head: Normocephalic and atraumatic.   Eyes:      Conjunctiva/sclera: Conjunctivae normal.   Cardiovascular:      Rate and Rhythm: Normal rate and regular rhythm.      Heart sounds: No murmur heard.  Pulmonary:      Effort: Pulmonary effort is normal. No respiratory distress.      Breath sounds: Normal breath sounds.   Abdominal:      Palpations: Abdomen is soft.      Tenderness: There is no abdominal tenderness.   Musculoskeletal:         General: No swelling.      Cervical back: Normal and neck supple.      Thoracic back: Spasms and tenderness present. No swelling, edema, deformity, signs of trauma, lacerations or bony tenderness. Normal range of motion. No scoliosis.      Lumbar back: Normal.        Back:       Comments: Tenderness along the lower border of the scapula of the paraspinal muscles of the thoracic spine along the left side.  No bony tenderness noted  Patient aggravated with some range of motion  No signs of bruising/swelling/redness   Skin:     General: Skin is warm and dry.      Capillary Refill: Capillary refill takes less than 2 seconds.   Neurological:      General: No focal deficit present.      Mental Status: He is alert and oriented to person, place, and time.      GCS: GCS eye subscore is 4. GCS verbal subscore is 5. GCS motor subscore is 6.   Psychiatric:         Mood and Affect: Mood normal.         ED Course & MDM   Diagnoses as of 02/07/24 0133   Chronic left-sided thoracic back pain   Eloped from emergency department     Labs Reviewed   COMPREHENSIVE METABOLIC  PANEL - Abnormal       Result Value    Glucose 146 (*)     Sodium 139      Potassium 4.1      Chloride 104      Bicarbonate 28      Anion Gap 11      Urea Nitrogen 23      Creatinine 1.01      eGFR 74      Calcium 9.0      Albumin 3.9      Alkaline Phosphatase 71      Total Protein 6.2 (*)     AST 17      Bilirubin, Total 0.9      ALT 19     SERIAL TROPONIN-INITIAL - Normal    Troponin I, High Sensitivity 6      Narrative:     Less than 99th percentile of normal range cutoff-  Female and children under 18 years old <14 ng/L; Male <21 ng/L: Negative  Repeat testing should be performed if clinically indicated.     Female and children under 18 years old 14-50 ng/L; Male 21-50 ng/L:  Consistent with possible cardiac damage and possible increased clinical   risk. Serial measurements may help to assess extent of myocardial damage.     >50 ng/L: Consistent with cardiac damage, increased clinical risk and  myocardial infarction. Serial measurements may help assess extent of   myocardial damage.      NOTE: Children less than 1 year old may have higher baseline troponin   levels and results should be interpreted in conjunction with the overall   clinical context.     NOTE: Troponin I testing is performed using a different   testing methodology at [facility] than at other   [facility] hospitals. Direct result comparisons should only   be made within the same method.   CBC WITH AUTO DIFFERENTIAL    WBC 5.8      nRBC 0.0      RBC 4.88      Hemoglobin 15.7      Hematocrit 47.8      MCV 98      MCH 32.2      MCHC 32.8      RDW 13.2      Platelets 253      Neutrophils % 67.7      Immature Granulocytes %, Automated 0.3      Lymphocytes % 15.9      Monocytes % 9.5      Eosinophils % 6.1      Basophils % 0.5      Neutrophils Absolute 3.91      Immature Granulocytes Absolute, Automated 0.02      Lymphocytes Absolute 0.92      Monocytes Absolute 0.55      Eosinophils Absolute 0.35      Basophils Absolute 0.03     GRAY TOP     Extra Tube Hold for add-ons.     TROPONIN SERIES- (INITIAL, 1 HR)    Narrative:     The following orders were created for panel order Troponin Series, (0, 1 HR).  Procedure                               Abnormality         Status                     ---------                               -----------         ------                     Troponin I, High Sensiti...[[id number]]  Normal              Final result               Troponin, High Sensitivi...[[id number]]                                                   Please view results for these tests on the individual orders.   SERIAL TROPONIN, 1 HOUR     XR chest 2 views   Final Result   1.  Cardiomegaly with mild pulmonary vascular congestion.   2. Small left-sided pleural effusion.   3. Left basilar patchy opacities, likely atelectasis. Aspiration or   pneumonia not excluded.   4. Trachea midline.   5. No pneumothorax.   6. Diffuse osteopenia.        Signed by: Aleksey Grant 2/6/2024 4:41 PM   Dictation workstation:   [id number]      XR thoracic spine 3 views   Final Result   No acute fracture or subluxation in the thoracic spine.        Diffuse osteopenia.        Multilevel thoracic spondylosis.             MACRO:   None        Signed by: Aleksey Grant 2/6/2024 3:08 PM   Dictation workstation:   [id number]        1351 -- A-fib rate 59.  Normal axis.  Normal intervals.  No ST-T changes or signs of ischemia.  no findings of STEMI. Unchanged compared to old EKG      Medical Decision Making  Patient upon arrival to the ED appeared to be anxious and uncomfortable but in no distress stable vital signs.  Discussed with patient's presenting complaints and clinical findings.  Reviewed with the patient's epic chart and counseled patient on upper back pain and appropriate approach to management/treatments.  After assessment and evaluation IV line was started, labs sent, imaging ordered, EKG reviewed, given IV Toradol, IV Norflex, lidocaine patch applied, placed on cardiac  monitor, and observed.  After treatment and a period of rest patient was reassessed and found to have minimal change in his complaint of discomfort, vital signs stable, initial results were reviewed discussed with patient, and further discussion was had with patient regarding his upper back complaint.  Patient was offered further treatments and workup and was agreeable with plan of care.  At this time his orders are being placed for further treatment patient was found to be gone from his room.  Patient appeared to have eloped from the ER and after speaking with front registration he was witnessed leaving with his belongings and not informing anybody.  Again at this time patient eloped from the ER without completion of treatment and final evaluation.    Amount and/or Complexity of Data Reviewed  External Data Reviewed: labs, radiology, ECG and notes.  Labs: ordered. Decision-making details documented in ED Course.  Radiology: ordered. Decision-making details documented in ED Course.  ECG/medicine tests: ordered and independent interpretation performed. Decision-making details documented in ED Course.        Procedure  Procedures     Lucas Garduno MD  02/07/24 0142

## 2024-02-08 ENCOUNTER — HOSPITAL ENCOUNTER (OUTPATIENT)
Dept: RADIOLOGY | Facility: HOSPITAL | Age: 84
Discharge: HOME | End: 2024-02-08
Payer: MEDICARE

## 2024-02-08 ENCOUNTER — OFFICE VISIT (OUTPATIENT)
Dept: PRIMARY CARE | Facility: CLINIC | Age: 84
End: 2024-02-08
Payer: MEDICARE

## 2024-02-08 VITALS
SYSTOLIC BLOOD PRESSURE: 140 MMHG | DIASTOLIC BLOOD PRESSURE: 80 MMHG | BODY MASS INDEX: 31.41 KG/M2 | HEART RATE: 66 BPM | WEIGHT: 237 LBS | OXYGEN SATURATION: 100 % | HEIGHT: 73 IN | RESPIRATION RATE: 16 BRPM

## 2024-02-08 DIAGNOSIS — J69.0 ASPIRATION PNEUMONIA OF LEFT LOWER LOBE, UNSPECIFIED ASPIRATION PNEUMONIA TYPE (MULTI): Primary | ICD-10-CM

## 2024-02-08 DIAGNOSIS — J69.0 ASPIRATION PNEUMONIA OF LEFT LOWER LOBE, UNSPECIFIED ASPIRATION PNEUMONIA TYPE (MULTI): ICD-10-CM

## 2024-02-08 PROCEDURE — 1125F AMNT PAIN NOTED PAIN PRSNT: CPT | Performed by: INTERNAL MEDICINE

## 2024-02-08 PROCEDURE — 1036F TOBACCO NON-USER: CPT | Performed by: INTERNAL MEDICINE

## 2024-02-08 PROCEDURE — 1170F FXNL STATUS ASSESSED: CPT | Performed by: INTERNAL MEDICINE

## 2024-02-08 PROCEDURE — 71250 CT THORAX DX C-: CPT | Mod: FOREIGN READ | Performed by: RADIOLOGY

## 2024-02-08 PROCEDURE — 3079F DIAST BP 80-89 MM HG: CPT | Performed by: INTERNAL MEDICINE

## 2024-02-08 PROCEDURE — 3077F SYST BP >= 140 MM HG: CPT | Performed by: INTERNAL MEDICINE

## 2024-02-08 PROCEDURE — 1159F MED LIST DOCD IN RCRD: CPT | Performed by: INTERNAL MEDICINE

## 2024-02-08 PROCEDURE — 71250 CT THORAX DX C-: CPT | Mod: FR

## 2024-02-08 PROCEDURE — 99215 OFFICE O/P EST HI 40 MIN: CPT | Performed by: INTERNAL MEDICINE

## 2024-02-08 RX ORDER — AMOXICILLIN 875 MG/1
875 TABLET, FILM COATED ORAL 2 TIMES DAILY
Qty: 20 TABLET | Refills: 0 | Status: SHIPPED | OUTPATIENT
Start: 2024-02-08 | End: 2024-02-18

## 2024-02-08 RX ORDER — METHYLPREDNISOLONE 4 MG/1
TABLET ORAL
Qty: 21 TABLET | Refills: 0 | Status: SHIPPED | OUTPATIENT
Start: 2024-02-08 | End: 2024-02-15

## 2024-02-08 ASSESSMENT — ACTIVITIES OF DAILY LIVING (ADL)
TAKING_MEDICATION: INDEPENDENT
BATHING: INDEPENDENT
DOING_HOUSEWORK: INDEPENDENT
MANAGING_FINANCES: INDEPENDENT
DRESSING: INDEPENDENT
GROCERY_SHOPPING: INDEPENDENT

## 2024-02-08 ASSESSMENT — ENCOUNTER SYMPTOMS
UNEXPECTED WEIGHT CHANGE: 0
AGITATION: 0
PALPITATIONS: 1
ABDOMINAL DISTENTION: 0
BACK PAIN: 1
SPEECH DIFFICULTY: 0
FEVER: 0
WHEEZING: 0
SHORTNESS OF BREATH: 1
CHILLS: 0

## 2024-02-08 ASSESSMENT — PATIENT HEALTH QUESTIONNAIRE - PHQ9
SUM OF ALL RESPONSES TO PHQ9 QUESTIONS 1 AND 2: 0
1. LITTLE INTEREST OR PLEASURE IN DOING THINGS: NOT AT ALL
2. FEELING DOWN, DEPRESSED OR HOPELESS: NOT AT ALL

## 2024-02-08 NOTE — PROGRESS NOTES
"Subjective   Patient ID: Stan Carrero is a 83 y.o. male who presents for Medicare Annual Wellness Visit Subsequent.    HPI 83-year-old male well-known to me comes with several week history of pain in his left mid back radiating to his left frontal chest.  Patient became concerned and went to the emergency room because he is a heart patient and he ruled out essentially for cardiac although troponin slightly high.  His chest x-ray was abnormal showing left pleural effusion atelectasis versus infiltrate thoracic spine spondylosis.  Patient really did not get any relief and left on his own accord after waiting 6 hours.  He denies having cough or aspiration fever or chills.  The pain is exacerbated by twisting turning in fact became very sore today while moving him onto his abdomen and examine the back    Review of Systems   Constitutional:  Negative for chills, fever and unexpected weight change.   HENT:  Negative for congestion.    Respiratory:  Positive for shortness of breath. Negative for wheezing.    Cardiovascular:  Positive for chest pain, palpitations and leg swelling.   Gastrointestinal:  Negative for abdominal distention.        Patient denies any coughing or choking with swallowing   Musculoskeletal:  Positive for back pain.   Neurological:  Negative for speech difficulty.   Psychiatric/Behavioral:  Negative for agitation.        Objective   /80 (BP Location: Right arm, Patient Position: Sitting)   Pulse 66   Resp 16   Ht 1.854 m (6' 1\")   Wt 108 kg (237 lb)   SpO2 100%   BMI 31.27 kg/m²     Physical Exam  Constitutional:       Appearance: He is obese.   Cardiovascular:      Rate and Rhythm: Rhythm irregular.      Heart sounds: Murmur heard.   Pulmonary:      Breath sounds: Rales present.      Comments: Left base decreased breath sounds with rales rhonchi egophony percussion dullness  Abdominal:      General: There is no distension.      Tenderness: There is no abdominal tenderness. "   Neurological:      General: No focal deficit present.      Mental Status: He is alert.   Psychiatric:         Mood and Affect: Mood normal.     ER report reviewed blood work reviewed chest x-ray and thoracic spine films reviewed.  I went over the results with the patient    Assessment/Plan   Diagnoses and all orders for this visit:  Left upper back pain is consistent with T6 thoracic radiculitis.  In addition patient has evidence of possible pneumonia with left pleural effusion and left infiltrate versus atelectasis.  I will needed chest CT to help differentiate.  I will treat with anti-inflammatory and antibiotic community-acquired pneumonia.  I will see the patient back in 1 week  Aspiration pneumonia of left lower lobe, unspecified aspiration pneumonia type (CMS/HCC)  -     CT chest wo IV contrast; Future  -     methylPREDNISolone (Medrol Dospak) 4 mg tablets; Take as directed on package.  -     amoxicillin (Amoxil) 875 mg tablet; Take 1 tablet (875 mg) by mouth 2 times a day for 10 days.

## 2024-03-29 ENCOUNTER — LAB (OUTPATIENT)
Dept: LAB | Facility: LAB | Age: 84
End: 2024-03-29
Payer: MEDICARE

## 2024-03-29 DIAGNOSIS — N40.1 BPH ASSOCIATED WITH NOCTURIA: ICD-10-CM

## 2024-03-29 DIAGNOSIS — E11.9 TYPE 2 DIABETES MELLITUS WITHOUT COMPLICATION, WITHOUT LONG-TERM CURRENT USE OF INSULIN (MULTI): ICD-10-CM

## 2024-03-29 DIAGNOSIS — I10 PRIMARY HYPERTENSION: ICD-10-CM

## 2024-03-29 DIAGNOSIS — I48.91 ATRIAL FIBRILLATION, UNSPECIFIED TYPE (MULTI): ICD-10-CM

## 2024-03-29 DIAGNOSIS — I25.10 CAD IN NATIVE ARTERY: ICD-10-CM

## 2024-03-29 DIAGNOSIS — R35.1 BPH ASSOCIATED WITH NOCTURIA: ICD-10-CM

## 2024-03-29 DIAGNOSIS — E78.00 PURE HYPERCHOLESTEROLEMIA: ICD-10-CM

## 2024-03-29 LAB
ALBUMIN SERPL BCP-MCNC: 4.1 G/DL (ref 3.4–5)
ALP SERPL-CCNC: 61 U/L (ref 33–136)
ALT SERPL W P-5'-P-CCNC: 20 U/L (ref 10–52)
ANION GAP SERPL CALC-SCNC: 12 MMOL/L (ref 10–20)
AST SERPL W P-5'-P-CCNC: 16 U/L (ref 9–39)
BILIRUB SERPL-MCNC: 0.8 MG/DL (ref 0–1.2)
BUN SERPL-MCNC: 19 MG/DL (ref 6–23)
CALCIUM SERPL-MCNC: 9.1 MG/DL (ref 8.6–10.3)
CARDIAC TROPONIN I PNL SERPL HS: 5 NG/L (ref 0–20)
CHLORIDE SERPL-SCNC: 103 MMOL/L (ref 98–107)
CHOLEST SERPL-MCNC: 131 MG/DL (ref 0–199)
CHOLESTEROL/HDL RATIO: 3.2
CO2 SERPL-SCNC: 28 MMOL/L (ref 21–32)
CREAT SERPL-MCNC: 1.28 MG/DL (ref 0.5–1.3)
EGFRCR SERPLBLD CKD-EPI 2021: 56 ML/MIN/1.73M*2
ERYTHROCYTE [DISTWIDTH] IN BLOOD BY AUTOMATED COUNT: 13.3 % (ref 11.5–14.5)
GLUCOSE SERPL-MCNC: 118 MG/DL (ref 74–99)
HCT VFR BLD AUTO: 47.3 % (ref 41–52)
HDLC SERPL-MCNC: 41.5 MG/DL
HGB BLD-MCNC: 15.7 G/DL (ref 13.5–17.5)
LDLC SERPL CALC-MCNC: 43 MG/DL
MCH RBC QN AUTO: 32.6 PG (ref 26–34)
MCHC RBC AUTO-ENTMCNC: 33.2 G/DL (ref 32–36)
MCV RBC AUTO: 98 FL (ref 80–100)
NON HDL CHOLESTEROL: 90 MG/DL (ref 0–149)
NRBC BLD-RTO: 0 /100 WBCS (ref 0–0)
PLATELET # BLD AUTO: 260 X10*3/UL (ref 150–450)
POTASSIUM SERPL-SCNC: 4.3 MMOL/L (ref 3.5–5.3)
PROT SERPL-MCNC: 6.4 G/DL (ref 6.4–8.2)
RBC # BLD AUTO: 4.81 X10*6/UL (ref 4.5–5.9)
SODIUM SERPL-SCNC: 139 MMOL/L (ref 136–145)
TRIGL SERPL-MCNC: 233 MG/DL (ref 0–149)
TSH SERPL-ACNC: 4.21 MIU/L (ref 0.44–3.98)
VLDL: 47 MG/DL (ref 0–40)
WBC # BLD AUTO: 5.2 X10*3/UL (ref 4.4–11.3)

## 2024-03-29 PROCEDURE — 83036 HEMOGLOBIN GLYCOSYLATED A1C: CPT

## 2024-03-29 PROCEDURE — 84153 ASSAY OF PSA TOTAL: CPT

## 2024-03-29 PROCEDURE — 85027 COMPLETE CBC AUTOMATED: CPT

## 2024-03-29 PROCEDURE — 84443 ASSAY THYROID STIM HORMONE: CPT

## 2024-03-29 PROCEDURE — 80061 LIPID PANEL: CPT

## 2024-03-29 PROCEDURE — 80053 COMPREHEN METABOLIC PANEL: CPT

## 2024-03-29 PROCEDURE — 84484 ASSAY OF TROPONIN QUANT: CPT

## 2024-03-29 PROCEDURE — 36415 COLL VENOUS BLD VENIPUNCTURE: CPT

## 2024-03-30 LAB
EST. AVERAGE GLUCOSE BLD GHB EST-MCNC: 151 MG/DL
HBA1C MFR BLD: 6.9 %
PSA SERPL-MCNC: 1.71 NG/ML

## 2024-04-02 ENCOUNTER — OFFICE VISIT (OUTPATIENT)
Dept: CARDIOLOGY | Facility: HOSPITAL | Age: 84
End: 2024-04-02
Payer: MEDICARE

## 2024-04-02 VITALS
BODY MASS INDEX: 31.36 KG/M2 | HEART RATE: 60 BPM | WEIGHT: 236.6 LBS | DIASTOLIC BLOOD PRESSURE: 80 MMHG | HEIGHT: 73 IN | SYSTOLIC BLOOD PRESSURE: 128 MMHG

## 2024-04-02 DIAGNOSIS — R07.9 CHEST PAIN, UNSPECIFIED TYPE: ICD-10-CM

## 2024-04-02 DIAGNOSIS — I48.91 ATRIAL FIBRILLATION, UNSPECIFIED TYPE (MULTI): ICD-10-CM

## 2024-04-02 DIAGNOSIS — I50.82 BIVENTRICULAR CONGESTIVE HEART FAILURE (MULTI): ICD-10-CM

## 2024-04-02 DIAGNOSIS — I10 PRIMARY HYPERTENSION: ICD-10-CM

## 2024-04-02 DIAGNOSIS — E78.00 PURE HYPERCHOLESTEROLEMIA: ICD-10-CM

## 2024-04-02 DIAGNOSIS — I25.10 CAD IN NATIVE ARTERY: Primary | ICD-10-CM

## 2024-04-02 LAB
ATRIAL RATE: 326 BPM
Q ONSET: 223 MS
QRS COUNT: 10 BEATS
QRS DURATION: 88 MS
QT INTERVAL: 422 MS
QTC CALCULATION(BAZETT): 422 MS
QTC FREDERICIA: 422 MS
R AXIS: 22 DEGREES
T AXIS: 54 DEGREES
T OFFSET: 434 MS
VENTRICULAR RATE: 60 BPM

## 2024-04-02 PROCEDURE — 3074F SYST BP LT 130 MM HG: CPT | Performed by: INTERNAL MEDICINE

## 2024-04-02 PROCEDURE — 1160F RVW MEDS BY RX/DR IN RCRD: CPT | Performed by: INTERNAL MEDICINE

## 2024-04-02 PROCEDURE — 99214 OFFICE O/P EST MOD 30 MIN: CPT | Performed by: INTERNAL MEDICINE

## 2024-04-02 PROCEDURE — 1159F MED LIST DOCD IN RCRD: CPT | Performed by: INTERNAL MEDICINE

## 2024-04-02 PROCEDURE — 1036F TOBACCO NON-USER: CPT | Performed by: INTERNAL MEDICINE

## 2024-04-02 PROCEDURE — 93005 ELECTROCARDIOGRAM TRACING: CPT | Performed by: INTERNAL MEDICINE

## 2024-04-02 PROCEDURE — 3079F DIAST BP 80-89 MM HG: CPT | Performed by: INTERNAL MEDICINE

## 2024-04-02 RX ORDER — REGADENOSON 0.08 MG/ML
0.4 INJECTION, SOLUTION INTRAVENOUS
Status: CANCELLED | OUTPATIENT
Start: 2024-04-02

## 2024-04-02 ASSESSMENT — ENCOUNTER SYMPTOMS
LOSS OF SENSATION IN FEET: 0
OCCASIONAL FEELINGS OF UNSTEADINESS: 1
DEPRESSION: 0

## 2024-04-02 NOTE — PROGRESS NOTES
Primary Care Physician: Mateo Chowdary MD  Date of Visit: 04/02/2024  1:00 PM EDT  Location of visit: [facility]     Chief Complaint:   Chief Complaint   Patient presents with    Follow-up    Chest Pain    Shortness of Breath    Palpitations        HPI / Summary:   Stan Carrero is a 83 y.o. male presents for followup.  He does complain of a 4-month history of left upper back and left-sided chest discomfort.  His symptoms started a few days after carrying a heavy tree when in his yard.  He notes a constant aching sensation in his left upper back left axilla and left chest.  It waxes and wanes in severity.  It is progressively improved over the last few months.  He was seen in the emergency department 2 months ago.  His troponin was negative.  The discomfort is not related to exertion.  He is able to walk up and down stairs without chest discomfort or shortness of breath.  He has mild chronic dyspnea on exertion when walking prolonged distances.  This has not changed in years.  He does note mild dyspnea when he initially lies down in bed at night.  This resolves in a few minutes.  He is then able to sleep supine without problems.  The patient denies palpitations, lightheadedness, syncope, orthopnea, paroxysmal nocturnal dyspnea, lower extremity edema, or bleeding problems.          Past Medical History:   Diagnosis Date    Chronic sinusitis, unspecified     Chronic sinusitis    Other conditions influencing health status     Absence Of One Kidney    Personal history of other diseases of urinary system     History of pyelonephritis        Past Surgical History:   Procedure Laterality Date    APPENDECTOMY  06/17/2013    Appendectomy    CATARACT EXTRACTION  09/04/2013    Cataract Surgery    OTHER SURGICAL HISTORY  09/04/2013    Previous Stent Placement          Social History:   reports that he has never smoked. He has never used smokeless tobacco. He reports that he does not drink alcohol and does not  use drugs.     Family History:  family history includes Alzheimer's disease in his mother; COPD in his father.      Allergies:  Allergies   Allergen Reactions    Lisinopril Rash    Shellfish Derived Rash       Outpatient Medications:  Current Outpatient Medications   Medication Instructions    adapalene (Differin) 0.1 % cream Topical, Daily RT    apixaban (Eliquis) 5 mg tablet 1 tablet, oral, 2 times daily    aspirin 81 mg EC tablet 1 tablet, oral, Daily    atorvastatin (Lipitor) 80 mg tablet 1 tablet, oral, Nightly    azelastine (Astelin) 137 mcg (0.1 %) nasal spray nasal    carboxymethylcellulose PF 1 % ophthalmic solution ophthalmic (eye)    cetirizine (ZyrTEC) 10 mg tablet 1 tablet, oral, Daily    clindamycin (Cleocin T) 1 % lotion APPLY SPARINGLY AND MASSAGE IN AS DIRECTED    clobetasol (Temovate) 0.05 % cream 2 times daily    clotrimazole-betamethasone (Lotrisone) cream apply twice a day as directed    dapagliflozin propanediol (Farxiga) 5 mg 1 tablet, oral, Daily    desonide (DesOwen) 0.05 % cream Topical, 2 times daily    diclofenac sodium (VOLTAREN) 2 g, Topical, 3 times daily PRN    erythromycin with Ethanol (Theramycin) 2 % external solution 2 times daily    furosemide (Lasix) 40 mg tablet 1 tablet, oral, Daily    levothyroxine (Synthroid, Levoxyl) 25 mcg tablet 1 tablet, oral, Daily    lidocaine (Lidoderm) 5 % patch 1 patch, transdermal, Daily, Remove & discard patch within 12 hours or as directed by MD.    losartan (Cozaar) 50 mg tablet 1 tablet, oral, Daily    metFORMIN XR (Glucophage-XR) 500 mg 24 hr tablet oral    metoprolol tartrate (Lopressor) 50 mg tablet 1 tablet, oral, 2 times daily    neomycin-polymyxin-HC (Cortisporin) otic solution otic (ear)    nitroglycerin (Nitrostat) 0.4 mg SL tablet sublingual    potassium chloride CR (Klor-Con M20) 20 mEq ER tablet 1 tablet, oral, Daily    tamsulosin (Flomax) 0.4 mg 24 hr capsule 1 capsule, oral, Daily    tiZANidine (ZANAFLEX) 4 mg, oral, 3 times daily  "PRN    triamcinolone (Kenalog) 0.5 % cream USE AS DIRECTED    vitamins A,C,E-zinc-copper (PreserVision AREDS) 2,148 mcg-113 mg-45 mg-17.4mg tablet 1 tablet, oral, 2 times daily       Physical Exam:  Vitals:    04/02/24 1313   BP: 128/80   BP Location: Left arm   Patient Position: Sitting   BP Cuff Size: Adult   Pulse: 60   Weight: 107 kg (236 lb 9.6 oz)   Height: 1.854 m (6' 1\")     Wt Readings from Last 5 Encounters:   04/02/24 107 kg (236 lb 9.6 oz)   02/08/24 108 kg (237 lb)   02/06/24 108 kg (237 lb)   10/05/23 109 kg (240 lb 1.3 oz)   10/03/23 107 kg (236 lb)     Body mass index is 31.22 kg/m².   General: Well-developed well-nourished in no acute distress  HEENT: Normocephalic atraumatic  Neck: Supple, JVP is normal negative hepatojugular reflux 2+ carotid pulses without bruit  Pulmonary: Normal respiratory effort, clear to auscultation  Cardiovascular: No right ventricular heave, normal S1 and S2, no murmurs rubs or gallops  Abdomen: Soft nontender nondistended  Extremities: Warm without edema 2+ radial pulses bilaterally 2+ posterior tibial pulses bilaterally  Neurologic: Alert and oriented x3  Psychiatric: Normal mood and affect     Last Labs:  CMP:  Recent Labs     03/29/24  1026 02/06/24  1424 09/29/23  1310    139 140   K 4.3 4.1 4.0    104 103   CO2 28 28 30   ANIONGAP 12 11 11   BUN 19 23 23   CREATININE 1.28 1.01 1.22   EGFR 56* 74  --    GLUCOSE 118* 146* 128*     Recent Labs     03/29/24  1026 02/06/24  1424 09/29/23  1310   ALBUMIN 4.1 3.9 4.0   ALKPHOS 61 71 80   ALT 20 19 22   AST 16 17 18   BILITOT 0.8 0.9 0.8     CBC:  Recent Labs     03/29/24  1026 02/06/24  1424 09/29/23  1310   WBC 5.2 5.8 6.9   HGB 15.7 15.7 15.9   HCT 47.3 47.8 48.3    253 266   MCV 98 98 99     COAG:   Recent Labs     12/13/19  0653   INR 1.3*     HEME/ENDO:  Recent Labs     03/29/24  1026 09/29/23  1310 04/06/23  1325 10/21/22  1311   TSH 4.21* 2.46  --  3.64   HGBA1C 6.9* 6.6* 6.8*  --       CARDIAC: "   Recent Labs     03/29/24  1026 02/06/24  1424   TROPHS 5 6     Recent Labs     03/29/24  1026 09/29/23  1310 09/30/22  1210 04/20/22  1253   CHOL 131 135 136 138   LDLF  --  54 53 56   LDLCALC 43  --   --   --    HDL 41.5 50.5 47.0 51.7   TRIG 233* 153* 179* 153*       Last Cardiology Tests:  ECG:  An electrocardiogram performed today that I reviewed shows atrial fibrillation and is otherwise unremarkable.    Echo:  Echocardiogram from October 2, 2020  CONCLUSIONS:   1. The left ventricular systolic function is normal with a 55-60% estimated ejection fraction.   2. Spectral Doppler shows an abnormal pattern of left ventricular diastolic filling.   3. There is mild to moderate tricuspid regurgitation.   4. The patient is in atrial fibrillation which may influence the estimate of left ventricular function and transvalvular flows.       Cath:  Cardiac catheterization October 14, 2015  Coronary Lesion Summary:  Vessel       Stenosis                Vessel Segment            Length (mm)  LAD          50% in-stent restenosis proximal to mid  LAD          85% stenosis            proximal third of the mid 10  1st Diagonal 70% stenosis            ostial  OM 1         40% stenosis            proximal  RCA          50% stenosis            proximal to mid   CONCLUSIONS:   1. Severe single vessel CAD of the LAD in a right dominant system.   2. Patent proximal LAD stent with diffuse ISR in the mid to distal stent.   3. Successful OCT guided PCI of the proximal to mid LAD with a 3.0 x 32mm Promus Premiere IVAN postdilated from 3.25 to 3.5mm.   4. Elevated LVEDP.   5. No aortic stenosis.    Stress Test:  Stress Results:  No results found for this or any previous visit from the past 365 days.         Cardiac Imaging:  CT chest without contrast February 2024  IMPRESSION:  1.  No acute thoracic findings.  2.  Atherosclerosis of the thoracic aorta and coronary arteries is  present.  Mild cardiomegaly noted.      Assessment/Plan    Diagnoses and all orders for this visit:  CAD in native artery  -     Follow Up In Cardiology  -     MR cardiac w and wo IV contrast w regadenoson stress for MORPH FUNCT and valve DZ; Future  -     regadenoson (Lexiscan) injection 0.4 mg  Atrial fibrillation, unspecified type (CMS/HCC)  -     ECG 12 lead (Clinic Performed)  -     Follow Up In Cardiology  Primary hypertension  Pure hypercholesterolemia  Chest pain, unspecified type  -     MR cardiac w and wo IV contrast w regadenoson stress for MORPH FUNCT and valve DZ; Future  -     regadenoson (Lexiscan) injection 0.4 mg    In summary Mr. Carrero is a pleasant 83 year-old white male with a past medical history significant for coronary artery disease status post bare-metal stent placement to his LAD in 2001 and subsequent drug-eluting stent placement to his LAD in 2015 with preserved left ventricular function, hypertension, hyperlipidemia, and type II non-insulin-requiring diabetes, persistent atrial fibrillation on anticoagulation, chronic diastolic heart failure, and frequent PVCs with nonsustained VT, and history of prior diverticular GI bleed.  He does notes back and chest discomfort that has been constant for the last several months.  The discomfort is likely musculoskeletal and is noncardiac.  He appears euvolemic on exam.  He does note mild dyspnea.  I did order a cardiac MRI stress test for further risk stratification.  He should continue his current cardiovascular medications.  He will notify me if his symptoms worsen or persist.  I will see him back in follow-up in 6 months.      Orders:  Orders Placed This Encounter   Procedures    MR cardiac w and wo IV contrast w regadenoson stress for MORPH FUNCT and valve DZ    ECG 12 lead (Clinic Performed)      Followup Appts:  Future Appointments   Date Time Provider Department Center   4/4/2024 10:30 AM Mateo Chowdary MD [id number] [facility]   10/1/2024  2:00 PM Stan Long MD [id number] [facility]   10/3/2024 11:00  MD HANNA [id number] [place]           ____________________________________________________________  MD [facility]

## 2024-04-04 ENCOUNTER — OFFICE VISIT (OUTPATIENT)
Dept: PRIMARY CARE | Facility: CLINIC | Age: 84
End: 2024-04-04
Payer: MEDICARE

## 2024-04-04 VITALS
OXYGEN SATURATION: 96 % | WEIGHT: 236 LBS | SYSTOLIC BLOOD PRESSURE: 124 MMHG | HEART RATE: 83 BPM | BODY MASS INDEX: 31.14 KG/M2 | DIASTOLIC BLOOD PRESSURE: 62 MMHG

## 2024-04-04 DIAGNOSIS — L30.9 ECZEMA, UNSPECIFIED TYPE: ICD-10-CM

## 2024-04-04 DIAGNOSIS — N18.2 TYPE 2 DIABETES MELLITUS WITH STAGE 2 CHRONIC KIDNEY DISEASE, WITHOUT LONG-TERM CURRENT USE OF INSULIN (MULTI): ICD-10-CM

## 2024-04-04 DIAGNOSIS — J30.9 ALLERGIC RHINITIS, UNSPECIFIED SEASONALITY, UNSPECIFIED TRIGGER: ICD-10-CM

## 2024-04-04 DIAGNOSIS — E11.22 TYPE 2 DIABETES MELLITUS WITH STAGE 2 CHRONIC KIDNEY DISEASE, WITHOUT LONG-TERM CURRENT USE OF INSULIN (MULTI): ICD-10-CM

## 2024-04-04 PROCEDURE — 1160F RVW MEDS BY RX/DR IN RCRD: CPT | Performed by: INTERNAL MEDICINE

## 2024-04-04 PROCEDURE — 3078F DIAST BP <80 MM HG: CPT | Performed by: INTERNAL MEDICINE

## 2024-04-04 PROCEDURE — 1170F FXNL STATUS ASSESSED: CPT | Performed by: INTERNAL MEDICINE

## 2024-04-04 PROCEDURE — 99214 OFFICE O/P EST MOD 30 MIN: CPT | Performed by: INTERNAL MEDICINE

## 2024-04-04 PROCEDURE — 3074F SYST BP LT 130 MM HG: CPT | Performed by: INTERNAL MEDICINE

## 2024-04-04 PROCEDURE — 1123F ACP DISCUSS/DSCN MKR DOCD: CPT | Performed by: INTERNAL MEDICINE

## 2024-04-04 PROCEDURE — G0439 PPPS, SUBSEQ VISIT: HCPCS | Performed by: INTERNAL MEDICINE

## 2024-04-04 PROCEDURE — 1159F MED LIST DOCD IN RCRD: CPT | Performed by: INTERNAL MEDICINE

## 2024-04-04 PROCEDURE — 1158F ADVNC CARE PLAN TLK DOCD: CPT | Performed by: INTERNAL MEDICINE

## 2024-04-04 RX ORDER — LOSARTAN POTASSIUM 50 MG/1
50 TABLET ORAL DAILY
Qty: 90 TABLET | Refills: 3 | Status: SHIPPED | OUTPATIENT
Start: 2024-04-04 | End: 2024-05-08 | Stop reason: SDUPTHER

## 2024-04-04 RX ORDER — FUROSEMIDE 40 MG/1
40 TABLET ORAL DAILY
Qty: 90 TABLET | Refills: 3 | Status: SHIPPED | OUTPATIENT
Start: 2024-04-04 | End: 2024-05-07 | Stop reason: SDUPTHER

## 2024-04-04 RX ORDER — ATORVASTATIN CALCIUM 80 MG/1
80 TABLET, FILM COATED ORAL NIGHTLY
Qty: 90 TABLET | Refills: 3 | Status: SHIPPED | OUTPATIENT
Start: 2024-04-04 | End: 2024-04-23 | Stop reason: SDUPTHER

## 2024-04-04 RX ORDER — METOPROLOL TARTRATE 50 MG/1
50 TABLET ORAL 2 TIMES DAILY
Qty: 180 TABLET | Refills: 3 | Status: SHIPPED | OUTPATIENT
Start: 2024-04-04 | End: 2025-04-04

## 2024-04-04 RX ORDER — POTASSIUM CHLORIDE 20 MEQ/1
20 TABLET, EXTENDED RELEASE ORAL DAILY
Qty: 90 TABLET | Refills: 3 | Status: SHIPPED | OUTPATIENT
Start: 2024-04-04 | End: 2024-04-23 | Stop reason: SDUPTHER

## 2024-04-04 RX ORDER — ASPIRIN 81 MG/1
81 TABLET ORAL DAILY
Qty: 90 TABLET | Refills: 3 | Status: SHIPPED | OUTPATIENT
Start: 2024-04-04 | End: 2025-04-04

## 2024-04-04 ASSESSMENT — ENCOUNTER SYMPTOMS
DEPRESSION: 0
OCCASIONAL FEELINGS OF UNSTEADINESS: 0
BACK PAIN: 1
LOSS OF SENSATION IN FEET: 0

## 2024-04-04 ASSESSMENT — ACTIVITIES OF DAILY LIVING (ADL)
MANAGING_FINANCES: INDEPENDENT
DRESSING: INDEPENDENT
DOING_HOUSEWORK: INDEPENDENT
GROCERY_SHOPPING: INDEPENDENT
BATHING: INDEPENDENT
TAKING_MEDICATION: INDEPENDENT

## 2024-04-04 ASSESSMENT — PATIENT HEALTH QUESTIONNAIRE - PHQ9
SUM OF ALL RESPONSES TO PHQ9 QUESTIONS 1 AND 2: 0
SUM OF ALL RESPONSES TO PHQ9 QUESTIONS 1 AND 2: 0
2. FEELING DOWN, DEPRESSED OR HOPELESS: NOT AT ALL
1. LITTLE INTEREST OR PLEASURE IN DOING THINGS: NOT AT ALL
1. LITTLE INTEREST OR PLEASURE IN DOING THINGS: NOT AT ALL
2. FEELING DOWN, DEPRESSED OR HOPELESS: NOT AT ALL

## 2024-04-04 NOTE — PROGRESS NOTES
Subjective   Reason for Visit: Stan Carrero is an 83 y.o. male here for a Medicare Wellness visit.     Past Medical, Surgical, and Family History reviewed and updated in chart.    Reviewed all medications by prescribing practitioner or clinical pharmacist (such as prescriptions, OTCs, herbal therapies and supplements) and documented in the medical record.    HPI    84 yo w m for amw  Patient continues to have pain in his right lower back is musculoskeletal it started when he tried to lift a heavy log from his yard to the woods on himself.  His workup with x-rays and CT was essentially negative    Patient Care Team:  Mateo Chowdary MD as PCP - General (Internal Medicine)  Sudarshan Leung MD as PCP - [employer] PCP     Review of Systems   Cardiovascular:  Positive for chest pain and leg swelling. Negative for palpitations.   Gastrointestinal:  Negative for abdominal distention and abdominal pain.   Musculoskeletal:  Positive for back pain.       Objective   Vitals:  /62   Pulse 83   Wt 107 kg (236 lb)   SpO2 96%   BMI 31.14 kg/m²       Physical Exam  Constitutional:       Appearance: He is obese.   Cardiovascular:      Rate and Rhythm: Rhythm irregular.      Heart sounds: Murmur heard.   Pulmonary:      Breath sounds: Rhonchi present.   Abdominal:      General: Bowel sounds are normal. There is no distension.   Musculoskeletal:         General: Tenderness (Tenderness left mid back) present.   Neurological:      Mental Status: He is alert and oriented to person, place, and time.   Psychiatric:         Mood and Affect: Mood normal.         Assessment/Plan   Diagnoses and all orders for this visit:  Eczema, unspecified type  -     desonide (DesOwen) 0.05 % cream; Apply topically 2 times a day.  -     clotrimazole-betamethasone (Lotrisone) cream; apply twice a day as directed  -     neomycin-polymyxin-HC (Cortisporin) otic solution; Administer 3 drops into each ear 4 times a day.  Type 2  diabetes mellitus with stage 2 chronic kidney disease, without long-term current use of insulin (CMS/[facility])  -     metFORMIN  mg 24 hr tablet; Take 1 tablet (500 mg) by mouth once daily in the evening. Take with meals.  -     dapagliflozin propanediol (Farxiga) 5 mg; Take 1 tablet (5 mg) by mouth once daily.  Allergic rhinitis, unspecified seasonality, unspecified trigger  -     azelastine (Astelin) 137 mcg (0.1 %) nasal spray; Administer 2 sprays into each nostril 2 times a day.  Back pain offered physical therapy patient will hold off for now as symptoms are getting better  Follow-up will be in 6 months

## 2024-04-09 RX ORDER — METFORMIN HYDROCHLORIDE 500 MG/1
500 TABLET, EXTENDED RELEASE ORAL
Qty: 90 TABLET | Refills: 3 | Status: SHIPPED | OUTPATIENT
Start: 2024-04-09

## 2024-04-09 RX ORDER — DESONIDE 0.5 MG/G
CREAM TOPICAL 2 TIMES DAILY
Qty: 60 G | Refills: 3 | Status: SHIPPED | OUTPATIENT
Start: 2024-04-09

## 2024-04-09 RX ORDER — CLOTRIMAZOLE AND BETAMETHASONE DIPROPIONATE 10; .64 MG/G; MG/G
CREAM TOPICAL
Qty: 135 G | Refills: 3 | Status: SHIPPED | OUTPATIENT
Start: 2024-04-09 | End: 2024-04-23 | Stop reason: SDUPTHER

## 2024-04-09 RX ORDER — NEOMYCIN SULFATE, POLYMYXIN B SULFATE, HYDROCORTISONE 3.5; 10000; 1 MG/ML; [USP'U]/ML; MG/ML
3 SOLUTION/ DROPS AURICULAR (OTIC) 4 TIMES DAILY
Qty: 30 ML | Refills: 3 | Status: SHIPPED | OUTPATIENT
Start: 2024-04-09

## 2024-04-09 RX ORDER — AZELASTINE 1 MG/ML
2 SPRAY, METERED NASAL 2 TIMES DAILY
Qty: 90 ML | Refills: 3 | Status: SHIPPED | OUTPATIENT
Start: 2024-04-09 | End: 2024-04-23 | Stop reason: SDUPTHER

## 2024-04-09 RX ORDER — DAPAGLIFLOZIN 5 MG/1
5 TABLET, FILM COATED ORAL DAILY
Qty: 90 TABLET | Refills: 3 | Status: SHIPPED | OUTPATIENT
Start: 2024-04-09 | End: 2024-04-23 | Stop reason: SDUPTHER

## 2024-04-09 ASSESSMENT — ENCOUNTER SYMPTOMS
ABDOMINAL PAIN: 0
ABDOMINAL DISTENTION: 0
PALPITATIONS: 0

## 2024-04-23 DIAGNOSIS — N18.2 TYPE 2 DIABETES MELLITUS WITH STAGE 2 CHRONIC KIDNEY DISEASE, WITHOUT LONG-TERM CURRENT USE OF INSULIN (MULTI): ICD-10-CM

## 2024-04-23 DIAGNOSIS — I50.82 BIVENTRICULAR CONGESTIVE HEART FAILURE (MULTI): ICD-10-CM

## 2024-04-23 DIAGNOSIS — R35.1 BPH ASSOCIATED WITH NOCTURIA: ICD-10-CM

## 2024-04-23 DIAGNOSIS — N40.1 BPH ASSOCIATED WITH NOCTURIA: ICD-10-CM

## 2024-04-23 DIAGNOSIS — E78.00 PURE HYPERCHOLESTEROLEMIA: ICD-10-CM

## 2024-04-23 DIAGNOSIS — J30.9 ALLERGIC RHINITIS, UNSPECIFIED SEASONALITY, UNSPECIFIED TRIGGER: ICD-10-CM

## 2024-04-23 DIAGNOSIS — E11.22 TYPE 2 DIABETES MELLITUS WITH STAGE 2 CHRONIC KIDNEY DISEASE, WITHOUT LONG-TERM CURRENT USE OF INSULIN (MULTI): ICD-10-CM

## 2024-04-23 DIAGNOSIS — L30.9 ECZEMA, UNSPECIFIED TYPE: ICD-10-CM

## 2024-04-23 RX ORDER — TRIAMCINOLONE ACETONIDE 5 MG/G
CREAM TOPICAL
Qty: 15 G | Refills: 2 | Status: SHIPPED | OUTPATIENT
Start: 2024-04-23 | End: 2024-05-07 | Stop reason: SDUPTHER

## 2024-04-23 RX ORDER — DAPAGLIFLOZIN 5 MG/1
5 TABLET, FILM COATED ORAL DAILY
Qty: 90 TABLET | Refills: 3 | Status: SHIPPED | OUTPATIENT
Start: 2024-04-23 | End: 2024-05-07 | Stop reason: SDUPTHER

## 2024-04-23 RX ORDER — TAMSULOSIN HYDROCHLORIDE 0.4 MG/1
0.4 CAPSULE ORAL DAILY
Qty: 90 CAPSULE | Refills: 3 | Status: SHIPPED | OUTPATIENT
Start: 2024-04-23 | End: 2024-05-07 | Stop reason: SDUPTHER

## 2024-04-23 RX ORDER — AZELASTINE 1 MG/ML
2 SPRAY, METERED NASAL 2 TIMES DAILY
Qty: 90 ML | Refills: 3 | Status: SHIPPED | OUTPATIENT
Start: 2024-04-23 | End: 2024-05-08 | Stop reason: SDUPTHER

## 2024-04-23 RX ORDER — CLOTRIMAZOLE AND BETAMETHASONE DIPROPIONATE 10; .64 MG/G; MG/G
CREAM TOPICAL
Qty: 135 G | Refills: 3 | Status: SHIPPED | OUTPATIENT
Start: 2024-04-23

## 2024-04-23 RX ORDER — ATORVASTATIN CALCIUM 80 MG/1
80 TABLET, FILM COATED ORAL NIGHTLY
Qty: 90 TABLET | Refills: 3 | Status: SHIPPED | OUTPATIENT
Start: 2024-04-23 | End: 2024-05-07 | Stop reason: SDUPTHER

## 2024-04-23 RX ORDER — POTASSIUM CHLORIDE 20 MEQ/1
20 TABLET, EXTENDED RELEASE ORAL DAILY
Qty: 90 TABLET | Refills: 3 | Status: SHIPPED | OUTPATIENT
Start: 2024-04-23 | End: 2024-05-08 | Stop reason: SDUPTHER

## 2024-05-07 DIAGNOSIS — N40.1 BPH ASSOCIATED WITH NOCTURIA: ICD-10-CM

## 2024-05-07 DIAGNOSIS — I50.82 BIVENTRICULAR CONGESTIVE HEART FAILURE (MULTI): ICD-10-CM

## 2024-05-07 DIAGNOSIS — R35.1 BPH ASSOCIATED WITH NOCTURIA: ICD-10-CM

## 2024-05-07 DIAGNOSIS — N18.2 TYPE 2 DIABETES MELLITUS WITH STAGE 2 CHRONIC KIDNEY DISEASE, WITHOUT LONG-TERM CURRENT USE OF INSULIN (MULTI): ICD-10-CM

## 2024-05-07 DIAGNOSIS — E78.00 PURE HYPERCHOLESTEROLEMIA: ICD-10-CM

## 2024-05-07 DIAGNOSIS — E11.22 TYPE 2 DIABETES MELLITUS WITH STAGE 2 CHRONIC KIDNEY DISEASE, WITHOUT LONG-TERM CURRENT USE OF INSULIN (MULTI): ICD-10-CM

## 2024-05-07 DIAGNOSIS — J30.9 ALLERGIC RHINITIS, UNSPECIFIED SEASONALITY, UNSPECIFIED TRIGGER: ICD-10-CM

## 2024-05-07 DIAGNOSIS — I25.10 CAD IN NATIVE ARTERY: Primary | ICD-10-CM

## 2024-05-07 DIAGNOSIS — R07.9 CHEST PAIN, UNSPECIFIED TYPE: ICD-10-CM

## 2024-05-07 DIAGNOSIS — I10 PRIMARY HYPERTENSION: ICD-10-CM

## 2024-05-07 RX ORDER — LEVOTHYROXINE SODIUM 25 UG/1
25 TABLET ORAL DAILY
Qty: 90 TABLET | Refills: 3 | Status: SHIPPED | OUTPATIENT
Start: 2024-05-07

## 2024-05-07 RX ORDER — TAMSULOSIN HYDROCHLORIDE 0.4 MG/1
0.4 CAPSULE ORAL DAILY
Qty: 90 CAPSULE | Refills: 3 | Status: SHIPPED | OUTPATIENT
Start: 2024-05-07 | End: 2024-05-08 | Stop reason: SDUPTHER

## 2024-05-07 RX ORDER — ADAPALENE 0.1 G/100G
CREAM TOPICAL
Qty: 45 G | Refills: 3 | Status: SHIPPED | OUTPATIENT
Start: 2024-05-07 | End: 2024-05-08 | Stop reason: SDUPTHER

## 2024-05-07 RX ORDER — FUROSEMIDE 40 MG/1
40 TABLET ORAL DAILY
Qty: 90 TABLET | Refills: 3 | Status: SHIPPED | OUTPATIENT
Start: 2024-05-07 | End: 2025-05-07

## 2024-05-07 RX ORDER — CLOBETASOL PROPIONATE 0.5 MG/G
CREAM TOPICAL 2 TIMES DAILY
Qty: 60 G | Refills: 5 | Status: SHIPPED | OUTPATIENT
Start: 2024-05-07

## 2024-05-07 RX ORDER — DAPAGLIFLOZIN 5 MG/1
5 TABLET, FILM COATED ORAL DAILY
Qty: 90 TABLET | Refills: 3 | Status: SHIPPED | OUTPATIENT
Start: 2024-05-07 | End: 2024-05-08 | Stop reason: SDUPTHER

## 2024-05-07 RX ORDER — TRIAMCINOLONE ACETONIDE 5 MG/G
CREAM TOPICAL
Qty: 15 G | Refills: 2 | Status: SHIPPED | OUTPATIENT
Start: 2024-05-07 | End: 2024-05-08 | Stop reason: SDUPTHER

## 2024-05-07 RX ORDER — ATORVASTATIN CALCIUM 80 MG/1
80 TABLET, FILM COATED ORAL NIGHTLY
Qty: 90 TABLET | Refills: 3 | Status: SHIPPED | OUTPATIENT
Start: 2024-05-07 | End: 2025-05-07

## 2024-05-07 NOTE — TELEPHONE ENCOUNTER
Patient is asking for a refill on his nitroglycerine to be sent to the [facility] mail order pharmacy.

## 2024-05-08 PROBLEM — R07.9 CHEST PAIN: Status: ACTIVE | Noted: 2024-05-08

## 2024-05-08 RX ORDER — LOSARTAN POTASSIUM 50 MG/1
50 TABLET ORAL DAILY
Qty: 90 TABLET | Refills: 3 | Status: SHIPPED | OUTPATIENT
Start: 2024-05-08 | End: 2025-05-08

## 2024-05-08 RX ORDER — POTASSIUM CHLORIDE 20 MEQ/1
20 TABLET, EXTENDED RELEASE ORAL DAILY
Qty: 90 TABLET | Refills: 3 | Status: SHIPPED | OUTPATIENT
Start: 2024-05-08 | End: 2025-05-08

## 2024-05-08 RX ORDER — NITROGLYCERIN 0.4 MG/1
0.4 TABLET SUBLINGUAL EVERY 5 MIN PRN
Qty: 25 TABLET | Refills: 1 | Status: SHIPPED | OUTPATIENT
Start: 2024-05-08

## 2024-05-08 RX ORDER — TRIAMCINOLONE ACETONIDE 5 MG/G
CREAM TOPICAL
Qty: 15 G | Refills: 2 | Status: SHIPPED | OUTPATIENT
Start: 2024-05-08

## 2024-05-08 RX ORDER — ADAPALENE 0.1 G/100G
CREAM TOPICAL
Qty: 45 G | Refills: 3 | Status: SHIPPED | OUTPATIENT
Start: 2024-05-08

## 2024-05-08 RX ORDER — AZELASTINE 1 MG/ML
2 SPRAY, METERED NASAL 2 TIMES DAILY
Qty: 90 ML | Refills: 3 | Status: SHIPPED | OUTPATIENT
Start: 2024-05-08

## 2024-05-08 RX ORDER — DAPAGLIFLOZIN 5 MG/1
5 TABLET, FILM COATED ORAL DAILY
Qty: 90 TABLET | Refills: 3 | Status: SHIPPED | OUTPATIENT
Start: 2024-05-08

## 2024-05-08 RX ORDER — TAMSULOSIN HYDROCHLORIDE 0.4 MG/1
0.4 CAPSULE ORAL DAILY
Qty: 90 CAPSULE | Refills: 3 | Status: SHIPPED | OUTPATIENT
Start: 2024-05-08

## 2024-05-23 ENCOUNTER — HOSPITAL ENCOUNTER (OUTPATIENT)
Dept: RADIOLOGY | Facility: HOSPITAL | Age: 84
Discharge: HOME | End: 2024-05-23
Payer: MEDICARE

## 2024-05-23 DIAGNOSIS — R07.9 CHEST PAIN, UNSPECIFIED TYPE: ICD-10-CM

## 2024-05-23 DIAGNOSIS — I25.10 CAD IN NATIVE ARTERY: Primary | ICD-10-CM

## 2024-05-23 DIAGNOSIS — I25.10 CAD IN NATIVE ARTERY: ICD-10-CM

## 2024-05-23 PROCEDURE — 2500000004 HC RX 250 GENERAL PHARMACY W/ HCPCS (ALT 636 FOR OP/ED): Performed by: INTERNAL MEDICINE

## 2024-05-23 PROCEDURE — 75563 CARD MRI W/STRESS IMG & DYE: CPT | Performed by: STUDENT IN AN ORGANIZED HEALTH CARE EDUCATION/TRAINING PROGRAM

## 2024-05-23 PROCEDURE — 75563 CARD MRI W/STRESS IMG & DYE: CPT

## 2024-05-23 PROCEDURE — A9575 INJ GADOTERATE MEGLUMI 0.1ML: HCPCS | Performed by: INTERNAL MEDICINE

## 2024-05-23 PROCEDURE — 2550000001 HC RX 255 CONTRASTS: Performed by: INTERNAL MEDICINE

## 2024-05-23 RX ORDER — REGADENOSON 0.08 MG/ML
0.4 INJECTION, SOLUTION INTRAVENOUS
Status: COMPLETED | OUTPATIENT
Start: 2024-05-23 | End: 2024-05-23

## 2024-05-23 RX ORDER — AMINOPHYLLINE 25 MG/ML
100 INJECTION, SOLUTION INTRAVENOUS ONCE
Qty: 4 ML | Refills: 0 | Status: COMPLETED | OUTPATIENT
Start: 2024-05-23 | End: 2024-05-23

## 2024-05-23 RX ORDER — GADOTERATE MEGLUMINE 376.9 MG/ML
30 INJECTION INTRAVENOUS
Status: COMPLETED | OUTPATIENT
Start: 2024-05-23 | End: 2024-05-23

## 2024-05-23 RX ADMIN — GADOTERATE MEGLUMINE 30 ML: 376.9 INJECTION INTRAVENOUS at 09:29

## 2024-05-23 RX ADMIN — REGADENOSON 0.4 MG: 0.08 INJECTION, SOLUTION INTRAVENOUS at 09:33

## 2024-05-23 RX ADMIN — AMINOPHYLLINE 100 MG: 25 INJECTION, SOLUTION INTRAVENOUS at 09:37

## 2024-05-23 NOTE — RESULT ENCOUNTER NOTE
Normal cardiac stress MRI.  No evidence of ischemia.  Normal LV function without significant valvular disease.  Very mildly dilated aortic root.  Recommend checking an echocardiogram in 1 year for surveillance.

## 2024-05-24 ENCOUNTER — TELEPHONE (OUTPATIENT)
Dept: CARDIOLOGY | Facility: HOSPITAL | Age: 84
End: 2024-05-24
Payer: MEDICARE

## 2024-05-24 NOTE — TELEPHONE ENCOUNTER
Result Communication    Resulted Orders   MR cardiac w and wo IV contrast w regadenoson stress for MORPH FUNCT and valve DZ    Narrative    Interpreted By:  Anatoliy Tabor,  and Tisha Erickson   STUDY:  MR CARDIAC W AND WO IV CONTRAST W REGADENOSON STRESS FOR MORPH/FUNCT  AND VALVE DZ;  5/23/2024 10:09 am      INDICATION:  Signs/Symptoms:CP, CAD.      COMPARISON:  None.      ACCESSION NUMBER(S):  [id number]      ORDERING CLINICIAN:  BLAKE LYN      TECHNIQUE:  Robinson 3.0 Srheya MRI scanner.  Turbo spin echo and balanced steady state free precession (bSSFP)  imaging for anatomic definition. Dynamic cine bSSFP for cardiac  chamber and wall-motion analysis, and valvular analysis. Flow  quantification sequences for hemodynamics. Delayed gadolinium  enhancement analysis after injection of gadolinium-chelate (mL of  Dotarem, 0.2 mmol/kg).      HT- 185.42 cm; WT-108.00 kg; BSA- 2.32 m2      Vital Signs: Pre-Procedure:  Sitting     /90 HR 85 RR 16    SP02 95%  Standing /88 HR 78 RR 16   SP02 98%      Stress Test:  Regadenoson  0.4mg  IV given at  9:33 am by Damion Bryant RN      Vital Signs:  2 min s/p Chrissy injection- /87     HR 75   SP02 93 %   c/o Sob,  denies chest discomfort.  4 min s/p Chrissy injection- /73     HR 69   SP02 98 %   Symptoms  resolving      Aminophylline 100mg given at  9:37 am by Damion Bryant RN      Vital Signs Recovery  2 min recovery:  /92     HR 60    SP02 97%   No symptoms.  4 min recovery:  /81     HR 72    SP02 95%   No symptoms.  6 min recovery:  /90     HR 64    SP02 95%   No symptoms.      FINDINGS:  CARDIAC CHAMBERS  Normal atrioventricular and ventriculoarterial concordance      LEFT ATRIUM  Moderately dilated (ARIADNA-58.8 ml/m2).      RIGHT ATRIUM  Severely dilated (RA max 4ch - 46.05 cm2)      INTERATRIAL SEPTUM  Aneurysmal      LEFT VENTRICLE:  1. Normal LV size and normal systolic function. Quantitative LVEF 55%.  2. No regional wall  "motion abnormalities.  3. Normal LV wall thickness and normal LV indexed mass.  4. T2 weighted imaging was not performed  5. T1 weighted imaging/parametric mapping was not performed  6. No evidence of LV thrombus.  7. Delayed-enhancement imaging reveals uniformly \"nulled\" myocardium,  signifying that there has been no prior ischemic myocardial damage.  There is also no definite evidence of interstitial fibrosis to  suggest an infiltrative process.      Quantitative left ventricular functional values are as follows:  EDV = 140 cc; EDVi = 61 cc/m2  ESV = 63 cc; ESVi = 27 cc/m2  Stroke volume = 77 cc; SVi = 33 cc/m2  LVEF = 55 %  Absolute Cardiac Output = 5.03 l/min.; COi = 2.17 l/min/m2  LV mass = 114 gm; LVMi = 49 gm/m2      LVEDD: 5.2 cm  LVESD: 3.16 cm  LV septal wall thickness (anterobasal): 1.0 cm  LV postero-inferior wall thickness: 0.9 cm      STRESS PERFUSION:  Following administration of regadenoson, during first pass perfusion,  there is uniform contrast wash-in in all visualized myocardial  segments.      RIGHT VENTRICLE  The right ventricle appears normal in size(EDVi-69 ml/m2), shape, and  has normal qualitative systolic function. Quantitative RVEF66 % no  segmental wall motion abnormalities. No abnormal delayed enhancement  in the myocardium.      INTERVENTRICULAR SEPTUM  Intact.      AORTIC VALVE  The aortic valve is trileaflet  There is  trivial aortic regurgitation.  Flow quantification through the ascending aorta:  Forward volume =88 cc/beat  Reverse volume = 4 cc/beat  Net forward volume = 84 cc/beat  Aortic regurgitant fraction = 5 %      MITRAL VALVE  There is  trivial mitral regurgitation.      TRICUSPID VALVE  There is qualitative mild tricuspid regurgitation.      PULMONARY VALVE:  Not assessed.      PERICARDIUM:  The pericardium is normal. There is no pericardial effusion.      THORACIC AORTA  The thoracic aorta appears normal in course, caliber, and contour.  There is no evidence for acute " aortic pathology. The arch vessel  branching pattern is  normal.   All the arch branch vessels appear  widely patent in their proximal portions.      AORTIC ROOT DIMENSIONS:  Aortic root(sinus of valsalva): 3.8 cm  Annulus: 2.3 cm  Sinotubular junction:2.6 cm      PULMONARY ARTERIES  The central pulmonary arteries appear  dilated (MPA-3.1 cm, RPA- 2.7  cm, LPA-2.5 cm).      SYSTEMIC AND PULMONARY VEINS  Normal systemic venous and pulmonary venous return.  The SVC is of normal caliber. IVC appears normal  Normal pulmonary venous anatomy.      CHEST  The chest wall is normal.  Limited imaging through the lungs reveals no gross abnormalities. No  pleural effusion.      UPPER ABDOMEN  Limited imaging through the upper abdomen reveals no abnormalities of  the visualized organs.        Impression    1. Normal LV size and systolic function. Quantitative LVEF 55 %. Poor  quality Imaging, likely underestimating LVEF  2. No evidence of inducible myocardial ischemia using regadenoson  stress perfusion imaging.  3. No evidence of myocardial fibrosis, infiltration or infarction  based on LGE imaging.  4. Normal RV size and systolic function. Quantitative RVEF66%.  5. Dilated aortic root with trivial AR (regurgitation fraction = 5%).  6. The central pulmonary arteries appear dilated correlate with  elevated right-sided filling pressures.          MACRO:  Table 1 Left ventricular parameters for adult men and women by age  group, papillary muscles included in left ventricular mass              * LVEDV/BSA (ml/m2): LVESV/BSA (ml/m2)  * : 14-53  * : 15-46  * : 13-50  * : 12-45  * 57-99: 13-46      * LVEDV/BSA (ml/m2): LVSV/BSA (ml/m2)  * : 40-68  * : 34-67  * : 36-68  * : 30-69  * 57-99: 34-63      * LVEDV/BSA (ml/m2): LVEF (%)  * : 46-74  * : 49-77  * : 48-76  * : 49-78  * 57-99: 48-76      * LVEDV/BSA (ml/m2): LVM/BSA (g/m2)  * : 44-87  * : 41-86  *  : 43-84  * : 42-83  * 57-99: 38-87                  * LVEDV/BSA (ml/m2): LVESV/BSA (ml/m2)  * : 16-43  * : 9-49  * 50-96: 12-42  * 48-89: 10-38  * 51-84: 14-35      * LVEDV/BSA (ml/m2): LVSV/BSA (ml/m2)  * : 38-63  * : 34-64  * 50-96: 32-64  * 48-89: 34-59  * 51-84: 31-58      * LVEDV/BSA (ml/m2): LVEF (%)  * : 50-73  * : 52-77  * 50-96: 50-76  * 48-89: 52-78  * 51-84: 53-77      * LVEDV/BSA (ml/m2): LVM/BSA (g/m2)  * : 29-72  * : 32-68  * 50-96: 32-66  * 48-89: 31-70  * 51-84: 31-74              Reference for heart dimensions for report:  https://jcmr-online.biomedcentral.com/articles/10.1186/[email or web address]  3-3      Signed by: Anatoliy Tabor 5/23/2024 2:18 PM  Dictation workstation:   [id number]       3:09 PM      Results were successfully communicated with the patient and they acknowledged their understanding.    We will schedule him for an echocardiogram at his October appointment.

## 2024-05-24 NOTE — TELEPHONE ENCOUNTER
----- Message from Stan Long MD sent at 5/23/2024  5:03 PM EDT -----  Normal cardiac stress MRI.  No evidence of ischemia.  Normal LV function without significant valvular disease.  Very mildly dilated aortic root.  Recommend checking an echocardiogram in 1 year for surveillance.

## 2024-07-17 DIAGNOSIS — N40.1 BPH ASSOCIATED WITH NOCTURIA: ICD-10-CM

## 2024-07-17 DIAGNOSIS — R35.1 BPH ASSOCIATED WITH NOCTURIA: ICD-10-CM

## 2024-07-17 RX ORDER — TAMSULOSIN HYDROCHLORIDE 0.4 MG/1
0.4 CAPSULE ORAL DAILY
Qty: 90 CAPSULE | Refills: 3 | Status: SHIPPED | OUTPATIENT
Start: 2024-07-17

## 2024-07-29 DIAGNOSIS — N40.1 BPH ASSOCIATED WITH NOCTURIA: ICD-10-CM

## 2024-07-29 DIAGNOSIS — R35.1 BPH ASSOCIATED WITH NOCTURIA: ICD-10-CM

## 2024-07-29 RX ORDER — TAMSULOSIN HYDROCHLORIDE 0.4 MG/1
0.4 CAPSULE ORAL DAILY
Qty: 90 CAPSULE | Refills: 0 | Status: SHIPPED | OUTPATIENT
Start: 2024-07-29

## 2024-09-23 ENCOUNTER — TELEPHONE (OUTPATIENT)
Dept: PRIMARY CARE | Facility: CLINIC | Age: 84
End: 2024-09-23
Payer: MEDICARE

## 2024-09-23 ENCOUNTER — TELEPHONE (OUTPATIENT)
Dept: CARDIOLOGY | Facility: HOSPITAL | Age: 84
End: 2024-09-23
Payer: MEDICARE

## 2024-09-23 DIAGNOSIS — E11.9 TYPE 2 DIABETES MELLITUS WITHOUT COMPLICATION, WITHOUT LONG-TERM CURRENT USE OF INSULIN (MULTI): ICD-10-CM

## 2024-09-23 DIAGNOSIS — E78.00 PURE HYPERCHOLESTEROLEMIA: ICD-10-CM

## 2024-09-23 DIAGNOSIS — I48.91 ATRIAL FIBRILLATION, UNSPECIFIED TYPE (MULTI): Primary | ICD-10-CM

## 2024-09-23 NOTE — TELEPHONE ENCOUNTER
Patient questioned whether or not he needed blood work for his upcoming appointment. Dr Long confirmed that blood work is due and placed the orders.   I left message informing patient that blood work is due.

## 2024-09-27 ENCOUNTER — LAB (OUTPATIENT)
Dept: LAB | Facility: LAB | Age: 84
End: 2024-09-27
Payer: MEDICARE

## 2024-09-27 DIAGNOSIS — E11.9 TYPE 2 DIABETES MELLITUS WITHOUT COMPLICATION, WITHOUT LONG-TERM CURRENT USE OF INSULIN (MULTI): ICD-10-CM

## 2024-09-27 DIAGNOSIS — I48.91 ATRIAL FIBRILLATION, UNSPECIFIED TYPE (MULTI): ICD-10-CM

## 2024-09-27 DIAGNOSIS — E78.00 PURE HYPERCHOLESTEROLEMIA: ICD-10-CM

## 2024-09-27 LAB
ALBUMIN SERPL BCP-MCNC: 4.1 G/DL (ref 3.4–5)
ALP SERPL-CCNC: 88 U/L (ref 33–136)
ALT SERPL W P-5'-P-CCNC: 21 U/L (ref 10–52)
ANION GAP SERPL CALC-SCNC: 13 MMOL/L (ref 10–20)
AST SERPL W P-5'-P-CCNC: 16 U/L (ref 9–39)
BILIRUB SERPL-MCNC: 1.1 MG/DL (ref 0–1.2)
BUN SERPL-MCNC: 17 MG/DL (ref 6–23)
CALCIUM SERPL-MCNC: 9.2 MG/DL (ref 8.6–10.3)
CHLORIDE SERPL-SCNC: 102 MMOL/L (ref 98–107)
CHOLEST SERPL-MCNC: 130 MG/DL (ref 0–199)
CHOLESTEROL/HDL RATIO: 2.9
CO2 SERPL-SCNC: 30 MMOL/L (ref 21–32)
CREAT SERPL-MCNC: 1.11 MG/DL (ref 0.5–1.3)
EGFRCR SERPLBLD CKD-EPI 2021: 66 ML/MIN/1.73M*2
ERYTHROCYTE [DISTWIDTH] IN BLOOD BY AUTOMATED COUNT: 13.3 % (ref 11.5–14.5)
GLUCOSE SERPL-MCNC: 124 MG/DL (ref 74–99)
HCT VFR BLD AUTO: 48.4 % (ref 41–52)
HDLC SERPL-MCNC: 44.7 MG/DL
HGB BLD-MCNC: 15.8 G/DL (ref 13.5–17.5)
LDLC SERPL CALC-MCNC: 44 MG/DL
MCH RBC QN AUTO: 32 PG (ref 26–34)
MCHC RBC AUTO-ENTMCNC: 32.6 G/DL (ref 32–36)
MCV RBC AUTO: 98 FL (ref 80–100)
NON HDL CHOLESTEROL: 85 MG/DL (ref 0–149)
NRBC BLD-RTO: 0 /100 WBCS (ref 0–0)
PLATELET # BLD AUTO: 314 X10*3/UL (ref 150–450)
POTASSIUM SERPL-SCNC: 3.9 MMOL/L (ref 3.5–5.3)
PROT SERPL-MCNC: 6.5 G/DL (ref 6.4–8.2)
RBC # BLD AUTO: 4.94 X10*6/UL (ref 4.5–5.9)
SODIUM SERPL-SCNC: 141 MMOL/L (ref 136–145)
TRIGL SERPL-MCNC: 208 MG/DL (ref 0–149)
TSH SERPL-ACNC: 3.95 MIU/L (ref 0.44–3.98)
VLDL: 42 MG/DL (ref 0–40)
WBC # BLD AUTO: 6.4 X10*3/UL (ref 4.4–11.3)

## 2024-09-27 PROCEDURE — 80061 LIPID PANEL: CPT

## 2024-09-27 PROCEDURE — 80053 COMPREHEN METABOLIC PANEL: CPT

## 2024-09-27 PROCEDURE — 85027 COMPLETE CBC AUTOMATED: CPT

## 2024-09-27 PROCEDURE — 84443 ASSAY THYROID STIM HORMONE: CPT

## 2024-09-27 PROCEDURE — 83036 HEMOGLOBIN GLYCOSYLATED A1C: CPT

## 2024-09-27 PROCEDURE — 36415 COLL VENOUS BLD VENIPUNCTURE: CPT

## 2024-09-28 LAB
EST. AVERAGE GLUCOSE BLD GHB EST-MCNC: 148 MG/DL
HBA1C MFR BLD: 6.8 %

## 2024-10-01 ENCOUNTER — OFFICE VISIT (OUTPATIENT)
Dept: CARDIOLOGY | Facility: HOSPITAL | Age: 84
End: 2024-10-01
Payer: MEDICARE

## 2024-10-01 VITALS
OXYGEN SATURATION: 95 % | SYSTOLIC BLOOD PRESSURE: 95 MMHG | DIASTOLIC BLOOD PRESSURE: 64 MMHG | BODY MASS INDEX: 31.51 KG/M2 | HEIGHT: 73 IN | WEIGHT: 237.8 LBS | HEART RATE: 75 BPM

## 2024-10-01 DIAGNOSIS — I50.32 CHRONIC DIASTOLIC CONGESTIVE HEART FAILURE: ICD-10-CM

## 2024-10-01 DIAGNOSIS — E78.00 PURE HYPERCHOLESTEROLEMIA: ICD-10-CM

## 2024-10-01 DIAGNOSIS — I25.10 CAD IN NATIVE ARTERY: Primary | ICD-10-CM

## 2024-10-01 DIAGNOSIS — I10 PRIMARY HYPERTENSION: ICD-10-CM

## 2024-10-01 DIAGNOSIS — I48.91 ATRIAL FIBRILLATION, UNSPECIFIED TYPE (MULTI): ICD-10-CM

## 2024-10-01 PROCEDURE — G2211 COMPLEX E/M VISIT ADD ON: HCPCS | Performed by: INTERNAL MEDICINE

## 2024-10-01 PROCEDURE — 99214 OFFICE O/P EST MOD 30 MIN: CPT | Performed by: INTERNAL MEDICINE

## 2024-10-01 PROCEDURE — 1159F MED LIST DOCD IN RCRD: CPT | Performed by: INTERNAL MEDICINE

## 2024-10-01 PROCEDURE — 1036F TOBACCO NON-USER: CPT | Performed by: INTERNAL MEDICINE

## 2024-10-01 PROCEDURE — 3074F SYST BP LT 130 MM HG: CPT | Performed by: INTERNAL MEDICINE

## 2024-10-01 PROCEDURE — 93005 ELECTROCARDIOGRAM TRACING: CPT | Performed by: INTERNAL MEDICINE

## 2024-10-01 PROCEDURE — 1160F RVW MEDS BY RX/DR IN RCRD: CPT | Performed by: INTERNAL MEDICINE

## 2024-10-01 PROCEDURE — 3078F DIAST BP <80 MM HG: CPT | Performed by: INTERNAL MEDICINE

## 2024-10-01 RX ORDER — POTASSIUM CHLORIDE 20 MEQ/1
20 TABLET, EXTENDED RELEASE ORAL DAILY
Qty: 90 TABLET | Refills: 3 | Status: SHIPPED | OUTPATIENT
Start: 2024-10-01 | End: 2025-10-01

## 2024-10-01 RX ORDER — FUROSEMIDE 40 MG/1
40 TABLET ORAL DAILY
Qty: 90 TABLET | Refills: 3 | Status: SHIPPED | OUTPATIENT
Start: 2024-10-01 | End: 2025-10-01

## 2024-10-01 RX ORDER — VITAMIN E (DL,TOCOPHERYL ACET) 90 MG
400 CAPSULE ORAL DAILY
COMMUNITY

## 2024-10-01 RX ORDER — DEXTROMETHORPHAN HYDROBROMIDE, GUAIFENESIN 5; 100 MG/5ML; MG/5ML
1300 LIQUID ORAL EVERY 8 HOURS PRN
COMMUNITY

## 2024-10-01 RX ORDER — LOSARTAN POTASSIUM 25 MG/1
25 TABLET ORAL DAILY
Qty: 90 TABLET | Refills: 3 | Status: SHIPPED | OUTPATIENT
Start: 2024-10-01 | End: 2025-10-01

## 2024-10-01 RX ORDER — METOPROLOL TARTRATE 50 MG/1
50 TABLET ORAL 2 TIMES DAILY
Qty: 180 TABLET | Refills: 3 | Status: SHIPPED | OUTPATIENT
Start: 2024-10-01 | End: 2025-10-01

## 2024-10-01 ASSESSMENT — ENCOUNTER SYMPTOMS
DEPRESSION: 0
OCCASIONAL FEELINGS OF UNSTEADINESS: 0
LOSS OF SENSATION IN FEET: 0

## 2024-10-01 NOTE — PROGRESS NOTES
Primary Care Physician: Mateo Chowdary MD  Date of Visit: 10/01/2024  2:00 PM EDT  Location of visit: [facility]     Chief Complaint:   Chief Complaint   Patient presents with    Follow-up        HPI / Summary:   Stan Carrero is a 84 y.o. male presents for followup.  He has no cardiac complaints.  He is able to walk up a flight of stairs without chest pain or shortness of breath.  He notes rare orthostatic lightheadedness if he stands up too quickly.  The patient denies chest pain, shortness of breath, palpitations, syncope, orthopnea, paroxysmal nocturnal dyspnea, lower extremity edema, or bleeding problems.        Past Medical History:   Diagnosis Date    Chronic sinusitis, unspecified     Chronic sinusitis    Other conditions influencing health status     Absence Of One Kidney    Personal history of other diseases of urinary system     History of pyelonephritis        Past Surgical History:   Procedure Laterality Date    APPENDECTOMY  06/17/2013    Appendectomy    CATARACT EXTRACTION  09/04/2013    Cataract Surgery    OTHER SURGICAL HISTORY  09/04/2013    Previous Stent Placement          Social History:   reports that he has never smoked. He has never used smokeless tobacco. He reports that he does not drink alcohol and does not use drugs.     Family History:  family history includes Alzheimer's disease in his mother; COPD in his father.      Allergies:  Allergies   Allergen Reactions    Lisinopril Rash    Shellfish Derived Rash       Outpatient Medications:  Current Outpatient Medications   Medication Instructions    acetaminophen (TYLENOL 8 HOUR) 1,300 mg, oral, Every 8 hours PRN, Do not crush, chew, or split.    adapalene (Differin) 0.1 % cream Topical, Daily RT    apixaban (ELIQUIS) 5 mg, oral, 2 times daily    aspirin 81 mg, oral, Daily    atorvastatin (LIPITOR) 80 mg, oral, Nightly    azelastine (Astelin) 137 mcg (0.1 %) nasal spray 2 sprays, Each Nostril, 2 times daily     "carboxymethylcellulose PF 1 % ophthalmic solution ophthalmic (eye)    cetirizine (ZyrTEC) 10 mg tablet 1 tablet, oral, Daily    clindamycin (Cleocin T) 1 % lotion APPLY SPARINGLY AND MASSAGE IN AS DIRECTED    clobetasol (Temovate) 0.05 % cream Topical, 2 times daily    clotrimazole-betamethasone (Lotrisone) cream apply twice a day as directed    coenzyme Q10 400 mg, oral, Daily    dapagliflozin propanediol (FARXIGA) 5 mg, oral, Daily    desonide (DesOwen) 0.05 % cream Topical, 2 times daily    diclofenac sodium (VOLTAREN) 2 g, Topical, 3 times daily PRN    erythromycin with Ethanol (Theramycin) 2 % external solution 2 times daily    furosemide (LASIX) 40 mg, oral, Daily    levothyroxine (SYNTHROID, LEVOXYL) 25 mcg, oral, Daily    lidocaine (Lidoderm) 5 % patch 1 patch, transdermal, Daily, Remove & discard patch within 12 hours or as directed by MD.    losartan (COZAAR) 50 mg, oral, Daily    metFORMIN XR (GLUCOPHAGE-XR) 500 mg, oral, Daily with evening meal    metoprolol tartrate (LOPRESSOR) 50 mg, oral, 2 times daily    neomycin-polymyxin-HC (Cortisporin) otic solution 3 drops, Each Ear, 4 times daily    nitroglycerin (NITROSTAT) 0.4 mg, sublingual, Every 5 min PRN    potassium chloride CR 20 mEq ER tablet 20 mEq, oral, Daily    tamsulosin (FLOMAX) 0.4 mg, oral, Daily    triamcinolone (Kenalog) 0.5 % cream USE AS DIRECTED    vitamins A,C,E-zinc-copper (PreserVision AREDS) 2,148 mcg-113 mg-45 mg-17.4mg tablet 1 tablet, oral, 2 times daily       Physical Exam:  Vitals:    10/01/24 1356   BP: 95/64   BP Location: Left arm   Patient Position: Sitting   BP Cuff Size: Adult   Pulse: 75   SpO2: 95%   Weight: 108 kg (237 lb 12.8 oz)   Height: 1.854 m (6' 1\")     Wt Readings from Last 5 Encounters:   10/01/24 108 kg (237 lb 12.8 oz)   04/04/24 107 kg (236 lb)   04/02/24 107 kg (236 lb 9.6 oz)   02/08/24 108 kg (237 lb)   02/06/24 108 kg (237 lb)     Body mass index is 31.37 kg/m².   General: Well-developed well-nourished in " no acute distress  HEENT: Normocephalic atraumatic  Neck: Supple, JVP is normal negative hepatojugular reflux 2+ carotid pulses without bruit  Pulmonary: Normal respiratory effort, clear to auscultation  Cardiovascular: No right ventricular heave, normal S1 and S2, no murmurs rubs or gallops  Abdomen: Soft nontender nondistended  Extremities: Warm 1+ bilateral ankle edema 2+ radial pulses bilaterally   Neurologic: Alert and oriented x3  Psychiatric: Normal mood and affect     Last Labs:  CMP:  Recent Labs     09/27/24  1116 03/29/24  1026 02/06/24  1424    139 139   K 3.9 4.3 4.1    103 104   CO2 30 28 28   ANIONGAP 13 12 11   BUN 17 19 23   CREATININE 1.11 1.28 1.01   EGFR 66 56* 74   GLUCOSE 124* 118* 146*     Recent Labs     09/27/24  1116 03/29/24  1026 02/06/24  1424   ALBUMIN 4.1 4.1 3.9   ALKPHOS 88 61 71   ALT 21 20 19   AST 16 16 17   BILITOT 1.1 0.8 0.9     CBC:  Recent Labs     09/27/24  1116 03/29/24  1026 02/06/24  1424   WBC 6.4 5.2 5.8   HGB 15.8 15.7 15.7   HCT 48.4 47.3 47.8    260 253   MCV 98 98 98     COAG:   Recent Labs     12/13/19  0653   INR 1.3*     HEME/ENDO:  Recent Labs     09/27/24  1116 03/29/24  1026 09/29/23  1310   TSH 3.95 4.21* 2.46   HGBA1C 6.8* 6.9* 6.6*      CARDIAC:   Recent Labs     03/29/24  1026 02/06/24  1424   TROPHS 5 6     Recent Labs     09/27/24  1116 03/29/24  1026 09/29/23  1310 09/30/22  1210 04/20/22  1253   CHOL 130 131 135 136 138   LDLF  --   --  54 53 56   LDLCALC 44 43  --   --   --    HDL 44.7 41.5 50.5 47.0 51.7   TRIG 208* 233* 153* 179* 153*       Last Cardiology Tests:  ECG:  An electrocardiogram performed today that I reviewed shows atrial fibrillation and is otherwise unremarkable.    Echo:  Echocardiogram from October 2, 2020  CONCLUSIONS:   1. The left ventricular systolic function is normal with a 55-60% estimated ejection fraction.   2. Spectral Doppler shows an abnormal pattern of left ventricular diastolic filling.   3. There is  mild to moderate tricuspid regurgitation.   4. The patient is in atrial fibrillation which may influence the estimate of left ventricular function and transvalvular flows.       Cath:  Cardiac catheterization October 14, 2015  Coronary Lesion Summary:  Vessel       Stenosis                Vessel Segment            Length (mm)  LAD          50% in-stent restenosis proximal to mid  LAD          85% stenosis            proximal third of the mid 10  1st Diagonal 70% stenosis            ostial  OM 1         40% stenosis            proximal  RCA          50% stenosis            proximal to mid   CONCLUSIONS:   1. Severe single vessel CAD of the LAD in a right dominant system.   2. Patent proximal LAD stent with diffuse ISR in the mid to distal stent.   3. Successful OCT guided PCI of the proximal to mid LAD with a 3.0 x 32mm Promus Premiere IVAN postdilated from 3.25 to 3.5mm.   4. Elevated LVEDP.   5. No aortic stenosis.    Stress Test:  Cardiac MRI stress May 23, 2024:  Impression   1. Normal LV size and systolic function. Quantitative LVEF 55 %. Poor  quality Imaging, likely underestimating LVEF  2. No evidence of inducible myocardial ischemia using regadenoson  stress perfusion imaging.  3. No evidence of myocardial fibrosis, infiltration or infarction  based on LGE imaging.  4. Normal RV size and systolic function. Quantitative RVEF66%.  5. Dilated aortic root with trivial AR (regurgitation fraction = 5%).  6. The central pulmonary arteries appear dilated correlate with  elevated right-sided filling pressures.            Cardiac Imaging:  CT chest without contrast February 2024  IMPRESSION:  1.  No acute thoracic findings.  2.  Atherosclerosis of the thoracic aorta and coronary arteries is  present.  Mild cardiomegaly noted.      Assessment/Plan   Diagnoses and all orders for this visit:  CAD in native artery  Atrial fibrillation, unspecified type (Multi)  Primary hypertension  Chronic diastolic congestive heart  failure  Pure hypercholesterolemia    In summary Mr. Carrero is a pleasant 83 year-old white male with a past medical history significant for coronary artery disease status post bare-metal stent placement to his LAD in 2001 and subsequent drug-eluting stent placement to his LAD in 2015 with preserved left ventricular function, hypertension, hyperlipidemia, and type II non-insulin-requiring diabetes, persistent atrial fibrillation on anticoagulation, mildly dilated aortic root on MRI, chronic diastolic heart failure, and frequent PVCs with nonsustained VT, and history of prior diverticular GI bleed.  He is asymptomatic from a cardiac perspective.  He appears euvolemic on exam.  His blood pressure is mildly reduced today.  I did decrease his losartan to 25 mg daily.  His recent lipid profile was marginal.  I encouraged him to increase his physical activity, modify his diet, and work on losing weight.  He should continue his other cardiovascular medications.  We will see him back in follow-up in 6 months.    Orders:  Orders Placed This Encounter   Procedures    ECG 12 Lead      Followup Appts:  Future Appointments   Date Time Provider Department Center   10/3/2024 11:00 AM Mateo Chowdary MD [id number] [facility]   4/8/2025 11:00 AM Mateo Chowdary MD [id number] [facility]   4/8/2025  1:00 PM Stan Long MD [id number] [facility]           ____________________________________________________________  Stan Long MD  [facility]  [facility]

## 2024-10-02 LAB
ATRIAL RATE: 80 BPM
Q ONSET: 222 MS
QRS COUNT: 13 BEATS
QRS DURATION: 90 MS
QT INTERVAL: 408 MS
QTC CALCULATION(BAZETT): 455 MS
QTC FREDERICIA: 439 MS
R AXIS: 28 DEGREES
T AXIS: 59 DEGREES
T OFFSET: 426 MS
VENTRICULAR RATE: 75 BPM

## 2024-10-03 ENCOUNTER — APPOINTMENT (OUTPATIENT)
Dept: PRIMARY CARE | Facility: CLINIC | Age: 84
End: 2024-10-03
Payer: MEDICARE

## 2024-10-03 VITALS — HEART RATE: 76 BPM | OXYGEN SATURATION: 94 % | SYSTOLIC BLOOD PRESSURE: 134 MMHG | DIASTOLIC BLOOD PRESSURE: 80 MMHG

## 2024-10-03 DIAGNOSIS — L30.9 ECZEMA, UNSPECIFIED TYPE: ICD-10-CM

## 2024-10-03 DIAGNOSIS — I48.11 LONGSTANDING PERSISTENT ATRIAL FIBRILLATION (MULTI): ICD-10-CM

## 2024-10-03 DIAGNOSIS — I10 PRIMARY HYPERTENSION: ICD-10-CM

## 2024-10-03 DIAGNOSIS — E78.00 PURE HYPERCHOLESTEROLEMIA: Primary | ICD-10-CM

## 2024-10-03 DIAGNOSIS — E11.22 TYPE 2 DIABETES MELLITUS WITH STAGE 2 CHRONIC KIDNEY DISEASE, WITHOUT LONG-TERM CURRENT USE OF INSULIN (MULTI): ICD-10-CM

## 2024-10-03 DIAGNOSIS — N18.2 TYPE 2 DIABETES MELLITUS WITH STAGE 2 CHRONIC KIDNEY DISEASE, WITHOUT LONG-TERM CURRENT USE OF INSULIN (MULTI): ICD-10-CM

## 2024-10-03 PROCEDURE — 90662 IIV NO PRSV INCREASED AG IM: CPT | Performed by: INTERNAL MEDICINE

## 2024-10-03 PROCEDURE — G0008 ADMIN INFLUENZA VIRUS VAC: HCPCS | Performed by: INTERNAL MEDICINE

## 2024-10-03 PROCEDURE — 3079F DIAST BP 80-89 MM HG: CPT | Performed by: INTERNAL MEDICINE

## 2024-10-03 PROCEDURE — 1036F TOBACCO NON-USER: CPT | Performed by: INTERNAL MEDICINE

## 2024-10-03 PROCEDURE — 99213 OFFICE O/P EST LOW 20 MIN: CPT | Performed by: INTERNAL MEDICINE

## 2024-10-03 PROCEDURE — 1160F RVW MEDS BY RX/DR IN RCRD: CPT | Performed by: INTERNAL MEDICINE

## 2024-10-03 PROCEDURE — 1159F MED LIST DOCD IN RCRD: CPT | Performed by: INTERNAL MEDICINE

## 2024-10-03 PROCEDURE — 3075F SYST BP GE 130 - 139MM HG: CPT | Performed by: INTERNAL MEDICINE

## 2024-10-03 RX ORDER — DESONIDE 0.5 MG/G
CREAM TOPICAL 2 TIMES DAILY
Qty: 60 G | Refills: 5 | Status: SHIPPED | OUTPATIENT
Start: 2024-10-03

## 2024-10-03 ASSESSMENT — ENCOUNTER SYMPTOMS
FREQUENCY: 1
LOSS OF SENSATION IN FEET: 0
DEPRESSION: 0
DIFFICULTY URINATING: 0
ABDOMINAL PAIN: 0
BRUISES/BLEEDS EASILY: 1
OCCASIONAL FEELINGS OF UNSTEADINESS: 0
DYSPHORIC MOOD: 0
PALPITATIONS: 1
SHORTNESS OF BREATH: 1
UNEXPECTED WEIGHT CHANGE: 0
ABDOMINAL DISTENTION: 0
ARTHRALGIAS: 1

## 2024-10-03 NOTE — PROGRESS NOTES
Subjective   Reason for Visit: Stan Carrero is an 84 y.o. male here for a Medicare Wellness visit.          Reviewed all medications by prescribing practitioner or clinical pharmacist (such as prescriptions, OTCs, herbal therapies and supplements) and documented in the medical record.    HPI  83 YO WM FOR AMW,and check up    Exercise walks some ,yard work limited    Patient Care Team:  Mateo Chowdary MD as PCP - General (Internal Medicine)  Sudarshan Leung MD as PCP - [employer] PCP     Review of Systems   Constitutional:  Negative for unexpected weight change.   Eyes:  Positive for visual disturbance (dry MD yearly check up).   Respiratory:  Positive for shortness of breath (some with lying).    Cardiovascular:  Positive for palpitations and leg swelling. Negative for chest pain.   Gastrointestinal:  Negative for abdominal distention and abdominal pain.   Genitourinary:  Positive for frequency. Negative for difficulty urinating.   Musculoskeletal:  Positive for arthralgias.   Neurological:  Negative for syncope.   Hematological:  Bruises/bleeds easily.   Psychiatric/Behavioral:  Negative for dysphoric mood.        Objective   Vitals:  /80 (BP Location: Right arm, Patient Position: Sitting)   Pulse 76   SpO2 94%       Physical Exam  Constitutional:       Appearance: He is obese. He is not ill-appearing.   Eyes:      General: No scleral icterus.  Neck:      Vascular: Carotid bruit (1+ bilaterally without bruits) present.   Cardiovascular:      Rate and Rhythm: Rhythm irregular.      Heart sounds: Murmur heard.      No gallop.   Pulmonary:      Breath sounds: Normal breath sounds.   Abdominal:      General: Abdomen is flat.      Palpations: Abdomen is soft.      Tenderness: There is no abdominal tenderness.   Musculoskeletal:      Right lower leg: Edema present.      Left lower leg: Edema present.      Comments: 1+ edema bilaterally   Neurological:      Comments: Diabetic foot exam normal  skin monofilament slightly decreased dorsal pedis posterior tibialis pulses 1+   Psychiatric:         Mood and Affect: Mood normal.     Recent labs reviewed CBC CMP A1c lipid    Assessment/Plan   Diagnoses and all orders for this visit:  Pure hypercholesterolemia  Good control  Eczema, unspecified type  -     desonide (DesOwen) 0.05 % cream; Apply topically 2 times a day.  Type 2 diabetes mellitus with stage 2 chronic kidney disease, without long-term current use of insulin (Multi)  Good control  Longstanding persistent atrial fibrillation (Multi)  Sees cardiology on anticoagulation  Primary hypertension  Good control  Other orders  -     Flu vaccine, trivalent, preservative free, HIGH-DOSE, age 65y+ (Fluzone)  Follow-up March

## 2024-12-23 DIAGNOSIS — J30.9 ALLERGIC RHINITIS, UNSPECIFIED SEASONALITY, UNSPECIFIED TRIGGER: ICD-10-CM

## 2024-12-23 DIAGNOSIS — L30.9 ECZEMA, UNSPECIFIED TYPE: ICD-10-CM

## 2024-12-26 RX ORDER — TRIAMCINOLONE ACETONIDE 5 MG/G
CREAM TOPICAL
Qty: 150 G | Refills: 3 | Status: SHIPPED | OUTPATIENT
Start: 2024-12-26

## 2024-12-26 RX ORDER — CLOTRIMAZOLE AND BETAMETHASONE DIPROPIONATE 10; .64 MG/G; MG/G
CREAM TOPICAL
Qty: 135 G | Refills: 3 | Status: SHIPPED | OUTPATIENT
Start: 2024-12-26

## 2024-12-30 DIAGNOSIS — R07.9 CHEST PAIN, UNSPECIFIED TYPE: Primary | ICD-10-CM

## 2024-12-30 DIAGNOSIS — I25.10 CAD IN NATIVE ARTERY: ICD-10-CM

## 2024-12-30 RX ORDER — NITROGLYCERIN 0.4 MG/1
0.4 TABLET SUBLINGUAL EVERY 5 MIN PRN
Qty: 25 TABLET | Refills: 1 | Status: SHIPPED | OUTPATIENT
Start: 2024-12-30

## 2024-12-30 NOTE — PROGRESS NOTES
Spoke to patient on phone. Verified with patient he has not been taking Nitro SL, but likes to have it on hand if needed. Refill sent to [email or web address].

## 2025-03-07 ENCOUNTER — TELEPHONE (OUTPATIENT)
Dept: CARDIOLOGY | Facility: HOSPITAL | Age: 85
End: 2025-03-07
Payer: MEDICARE

## 2025-03-07 DIAGNOSIS — N18.2 TYPE 2 DIABETES MELLITUS WITH STAGE 2 CHRONIC KIDNEY DISEASE, WITHOUT LONG-TERM CURRENT USE OF INSULIN (MULTI): ICD-10-CM

## 2025-03-07 DIAGNOSIS — N40.1 BPH ASSOCIATED WITH NOCTURIA: ICD-10-CM

## 2025-03-07 DIAGNOSIS — J30.9 ALLERGIC RHINITIS, UNSPECIFIED SEASONALITY, UNSPECIFIED TRIGGER: ICD-10-CM

## 2025-03-07 DIAGNOSIS — N41.1 CHRONIC PROSTATITIS: Primary | ICD-10-CM

## 2025-03-07 DIAGNOSIS — I50.32 CHRONIC DIASTOLIC CONGESTIVE HEART FAILURE: ICD-10-CM

## 2025-03-07 DIAGNOSIS — I50.82 BIVENTRICULAR CONGESTIVE HEART FAILURE: ICD-10-CM

## 2025-03-07 DIAGNOSIS — E11.22 TYPE 2 DIABETES MELLITUS WITH STAGE 2 CHRONIC KIDNEY DISEASE, WITHOUT LONG-TERM CURRENT USE OF INSULIN (MULTI): ICD-10-CM

## 2025-03-07 DIAGNOSIS — E78.00 PURE HYPERCHOLESTEROLEMIA: ICD-10-CM

## 2025-03-07 DIAGNOSIS — R94.8 ABNORMAL RESULTS OF FUNCTION STUDIES OF OTHER ORGANS AND SYSTEMS: ICD-10-CM

## 2025-03-07 DIAGNOSIS — L30.9 ECZEMA, UNSPECIFIED TYPE: ICD-10-CM

## 2025-03-07 DIAGNOSIS — R35.1 BPH ASSOCIATED WITH NOCTURIA: ICD-10-CM

## 2025-03-07 DIAGNOSIS — I10 PRIMARY HYPERTENSION: ICD-10-CM

## 2025-03-07 DIAGNOSIS — E78.00 HYPERCHOLESTEROLEMIA: ICD-10-CM

## 2025-03-07 NOTE — TELEPHONE ENCOUNTER
Patient called requesting blood work be put in for him to have done prior to his office visit 4/8. He asked if we can check his PSA, Hemoglobin A1C, CMP, lipid, and TSH.

## 2025-03-15 LAB
ALBUMIN SERPL-MCNC: 4.1 G/DL (ref 3.6–5.1)
ALBUMIN SERPL-MCNC: 4.1 G/DL (ref 3.6–5.1)
ALBUMIN/CREAT UR: 3 MG/G CREAT
ALP SERPL-CCNC: 80 U/L (ref 35–144)
ALP SERPL-CCNC: 85 U/L (ref 35–144)
ALT SERPL-CCNC: 20 U/L (ref 9–46)
ALT SERPL-CCNC: 21 U/L (ref 9–46)
ANION GAP SERPL CALCULATED.4IONS-SCNC: 11 MMOL/L (CALC) (ref 7–17)
ANION GAP SERPL CALCULATED.4IONS-SCNC: 11 MMOL/L (CALC) (ref 7–17)
APPEARANCE UR: CLEAR
AST SERPL-CCNC: 14 U/L (ref 10–35)
AST SERPL-CCNC: 15 U/L (ref 10–35)
BACTERIA #/AREA URNS HPF: ABNORMAL /HPF
BACTERIA UR CULT: ABNORMAL
BILIRUB SERPL-MCNC: 0.6 MG/DL (ref 0.2–1.2)
BILIRUB SERPL-MCNC: 0.6 MG/DL (ref 0.2–1.2)
BILIRUB UR QL STRIP: NEGATIVE
BUN SERPL-MCNC: 26 MG/DL (ref 7–25)
BUN SERPL-MCNC: 26 MG/DL (ref 7–25)
CALCIUM SERPL-MCNC: 9.6 MG/DL (ref 8.6–10.3)
CALCIUM SERPL-MCNC: 9.6 MG/DL (ref 8.6–10.3)
CHLORIDE SERPL-SCNC: 103 MMOL/L (ref 98–110)
CHLORIDE SERPL-SCNC: 104 MMOL/L (ref 98–110)
CHOLEST SERPL-MCNC: 139 MG/DL
CHOLEST SERPL-MCNC: 141 MG/DL
CHOLEST/HDLC SERPL: 2.5 (CALC)
CHOLEST/HDLC SERPL: 2.6 (CALC)
CO2 SERPL-SCNC: 27 MMOL/L (ref 20–32)
CO2 SERPL-SCNC: 28 MMOL/L (ref 20–32)
COLOR UR: YELLOW
CREAT SERPL-MCNC: 1.02 MG/DL (ref 0.7–1.22)
CREAT SERPL-MCNC: 1.03 MG/DL (ref 0.7–1.22)
CREAT UR-MCNC: 67 MG/DL (ref 20–320)
EGFRCR SERPLBLD CKD-EPI 2021: 72 ML/MIN/1.73M2
EGFRCR SERPLBLD CKD-EPI 2021: 73 ML/MIN/1.73M2
EST. AVERAGE GLUCOSE BLD GHB EST-MCNC: 166 MG/DL
EST. AVERAGE GLUCOSE BLD GHB EST-MCNC: 169 MG/DL
EST. AVERAGE GLUCOSE BLD GHB EST-SCNC: 9.2 MMOL/L
EST. AVERAGE GLUCOSE BLD GHB EST-SCNC: 9.3 MMOL/L
GLUCOSE SERPL-MCNC: 138 MG/DL (ref 65–99)
GLUCOSE SERPL-MCNC: 141 MG/DL (ref 65–99)
GLUCOSE UR QL STRIP: ABNORMAL
HBA1C MFR BLD: 7.4 % OF TOTAL HGB
HBA1C MFR BLD: 7.5 % OF TOTAL HGB
HDLC SERPL-MCNC: 54 MG/DL
HDLC SERPL-MCNC: 56 MG/DL
HGB UR QL STRIP: NEGATIVE
HYALINE CASTS #/AREA URNS LPF: ABNORMAL /LPF
KETONES UR QL STRIP: NEGATIVE
LDLC SERPL CALC-MCNC: 60 MG/DL (CALC)
LDLC SERPL CALC-MCNC: 60 MG/DL (CALC)
LEUKOCYTE ESTERASE UR QL STRIP: NEGATIVE
MICROALBUMIN UR-MCNC: 0.2 MG/DL
NITRITE UR QL STRIP: NEGATIVE
NONHDLC SERPL-MCNC: 85 MG/DL (CALC)
NONHDLC SERPL-MCNC: 85 MG/DL (CALC)
PH UR STRIP: 6.5 [PH] (ref 5–8)
POTASSIUM SERPL-SCNC: 4.1 MMOL/L (ref 3.5–5.3)
POTASSIUM SERPL-SCNC: 4.4 MMOL/L (ref 3.5–5.3)
PROT SERPL-MCNC: 6.2 G/DL (ref 6.1–8.1)
PROT SERPL-MCNC: 6.3 G/DL (ref 6.1–8.1)
PROT UR QL STRIP: NEGATIVE
PSA FREE MFR SERPL: NORMAL %
PSA FREE SERPL-MCNC: NORMAL NG/ML
PSA SERPL-MCNC: 1.32 NG/ML
PSA SERPL-MCNC: NORMAL NG/ML
RBC #/AREA URNS HPF: ABNORMAL /HPF
SERVICE CMNT-IMP: ABNORMAL
SODIUM SERPL-SCNC: 142 MMOL/L (ref 135–146)
SODIUM SERPL-SCNC: 142 MMOL/L (ref 135–146)
SP GR UR STRIP: 1.02 (ref 1–1.03)
SQUAMOUS #/AREA URNS HPF: ABNORMAL /HPF
T4 FREE SERPL-MCNC: 1.3 NG/DL (ref 0.8–1.8)
T4 FREE SERPL-MCNC: 1.3 NG/DL (ref 0.8–1.8)
TRIGL SERPL-MCNC: 167 MG/DL
TRIGL SERPL-MCNC: 169 MG/DL
TSH SERPL-ACNC: 5.29 MIU/L (ref 0.4–4.5)
TSH SERPL-ACNC: 5.38 MIU/L (ref 0.4–4.5)
WBC #/AREA URNS HPF: ABNORMAL /HPF

## 2025-03-17 LAB
ALBUMIN SERPL-MCNC: 4.1 G/DL (ref 3.6–5.1)
ALP SERPL-CCNC: 80 U/L (ref 35–144)
ALT SERPL-CCNC: 21 U/L (ref 9–46)
ANION GAP SERPL CALCULATED.4IONS-SCNC: 11 MMOL/L (CALC) (ref 7–17)
AST SERPL-CCNC: 15 U/L (ref 10–35)
BILIRUB SERPL-MCNC: 0.6 MG/DL (ref 0.2–1.2)
BUN SERPL-MCNC: 26 MG/DL (ref 7–25)
CALCIUM SERPL-MCNC: 9.6 MG/DL (ref 8.6–10.3)
CHLORIDE SERPL-SCNC: 104 MMOL/L (ref 98–110)
CHOLEST SERPL-MCNC: 141 MG/DL
CHOLEST/HDLC SERPL: 2.5 (CALC)
CO2 SERPL-SCNC: 27 MMOL/L (ref 20–32)
CREAT SERPL-MCNC: 1.03 MG/DL (ref 0.7–1.22)
EGFRCR SERPLBLD CKD-EPI 2021: 72 ML/MIN/1.73M2
EST. AVERAGE GLUCOSE BLD GHB EST-MCNC: 169 MG/DL
EST. AVERAGE GLUCOSE BLD GHB EST-SCNC: 9.3 MMOL/L
GLUCOSE SERPL-MCNC: 138 MG/DL (ref 65–99)
HBA1C MFR BLD: 7.5 % OF TOTAL HGB
HDLC SERPL-MCNC: 56 MG/DL
LDLC SERPL CALC-MCNC: 60 MG/DL (CALC)
NONHDLC SERPL-MCNC: 85 MG/DL (CALC)
POTASSIUM SERPL-SCNC: 4.1 MMOL/L (ref 3.5–5.3)
PROT SERPL-MCNC: 6.3 G/DL (ref 6.1–8.1)
PSA FREE MFR SERPL: 31 % (CALC)
PSA FREE SERPL-MCNC: 0.5 NG/ML
PSA SERPL-MCNC: 1.6 NG/ML
SODIUM SERPL-SCNC: 142 MMOL/L (ref 135–146)
T4 FREE SERPL-MCNC: 1.3 NG/DL (ref 0.8–1.8)
TRIGL SERPL-MCNC: 169 MG/DL
TSH SERPL-ACNC: 5.38 MIU/L (ref 0.4–4.5)

## 2025-03-20 ENCOUNTER — APPOINTMENT (OUTPATIENT)
Dept: PRIMARY CARE | Facility: CLINIC | Age: 85
End: 2025-03-20
Payer: MEDICARE

## 2025-03-20 VITALS
OXYGEN SATURATION: 96 % | WEIGHT: 229 LBS | HEART RATE: 65 BPM | SYSTOLIC BLOOD PRESSURE: 110 MMHG | BODY MASS INDEX: 30.35 KG/M2 | DIASTOLIC BLOOD PRESSURE: 80 MMHG | HEIGHT: 73 IN

## 2025-03-20 DIAGNOSIS — I48.91 ATRIAL FIBRILLATION, UNSPECIFIED TYPE (MULTI): ICD-10-CM

## 2025-03-20 DIAGNOSIS — E11.9 TYPE 2 DIABETES MELLITUS WITHOUT COMPLICATION, WITHOUT LONG-TERM CURRENT USE OF INSULIN (MULTI): ICD-10-CM

## 2025-03-20 DIAGNOSIS — I50.22 CHRONIC SYSTOLIC CONGESTIVE HEART FAILURE: ICD-10-CM

## 2025-03-20 DIAGNOSIS — E03.9 ACQUIRED HYPOTHYROIDISM: Primary | ICD-10-CM

## 2025-03-20 PROCEDURE — 1036F TOBACCO NON-USER: CPT | Performed by: INTERNAL MEDICINE

## 2025-03-20 PROCEDURE — 3074F SYST BP LT 130 MM HG: CPT | Performed by: INTERNAL MEDICINE

## 2025-03-20 PROCEDURE — 1158F ADVNC CARE PLAN TLK DOCD: CPT | Performed by: INTERNAL MEDICINE

## 2025-03-20 PROCEDURE — 1123F ACP DISCUSS/DSCN MKR DOCD: CPT | Performed by: INTERNAL MEDICINE

## 2025-03-20 PROCEDURE — 99214 OFFICE O/P EST MOD 30 MIN: CPT | Performed by: INTERNAL MEDICINE

## 2025-03-20 PROCEDURE — 3079F DIAST BP 80-89 MM HG: CPT | Performed by: INTERNAL MEDICINE

## 2025-03-20 RX ORDER — FUROSEMIDE 40 MG/1
40 TABLET ORAL DAILY
Qty: 90 TABLET | Refills: 3 | Status: SHIPPED | OUTPATIENT
Start: 2025-03-20 | End: 2026-03-20

## 2025-03-20 RX ORDER — DAPAGLIFLOZIN 5 MG/1
5 TABLET, FILM COATED ORAL DAILY
Qty: 90 TABLET | Refills: 3 | Status: SHIPPED | OUTPATIENT
Start: 2025-03-20

## 2025-03-20 RX ORDER — LEVOTHYROXINE SODIUM 50 UG/1
50 TABLET ORAL DAILY
Qty: 90 TABLET | Refills: 3 | Status: SHIPPED | OUTPATIENT
Start: 2025-03-20 | End: 2026-03-20

## 2025-03-20 RX ORDER — ATORVASTATIN CALCIUM 80 MG/1
80 TABLET, FILM COATED ORAL NIGHTLY
Qty: 90 TABLET | Refills: 3 | Status: SHIPPED | OUTPATIENT
Start: 2025-03-20 | End: 2026-03-20

## 2025-03-20 RX ORDER — DESONIDE 0.5 MG/G
CREAM TOPICAL 2 TIMES DAILY
Qty: 60 G | Refills: 5 | Status: SHIPPED | OUTPATIENT
Start: 2025-03-20

## 2025-03-20 RX ORDER — TAMSULOSIN HYDROCHLORIDE 0.4 MG/1
0.4 CAPSULE ORAL DAILY
Qty: 90 CAPSULE | Refills: 0 | Status: SHIPPED | OUTPATIENT
Start: 2025-03-20

## 2025-03-20 RX ORDER — METFORMIN HYDROCHLORIDE 500 MG/1
500 TABLET, EXTENDED RELEASE ORAL
Qty: 90 TABLET | Refills: 3 | Status: SHIPPED | OUTPATIENT
Start: 2025-03-20

## 2025-03-20 RX ORDER — TRIAMCINOLONE ACETONIDE 5 MG/G
CREAM TOPICAL
Qty: 150 G | Refills: 3 | Status: SHIPPED | OUTPATIENT
Start: 2025-03-20

## 2025-03-20 RX ORDER — AZELASTINE 1 MG/ML
2 SPRAY, METERED NASAL 2 TIMES DAILY
Qty: 90 ML | Refills: 3 | Status: SHIPPED | OUTPATIENT
Start: 2025-03-20

## 2025-03-20 RX ORDER — NEOMYCIN SULFATE, POLYMYXIN B SULFATE, HYDROCORTISONE 3.5; 10000; 1 MG/ML; [USP'U]/ML; MG/ML
3 SOLUTION/ DROPS AURICULAR (OTIC) 4 TIMES DAILY
Qty: 30 ML | Refills: 3 | Status: SHIPPED | OUTPATIENT
Start: 2025-03-20

## 2025-03-20 ASSESSMENT — ENCOUNTER SYMPTOMS
DYSPHORIC MOOD: 0
ABDOMINAL PAIN: 0
ARTHRALGIAS: 1
DEPRESSION: 0
LOSS OF SENSATION IN FEET: 0
OCCASIONAL FEELINGS OF UNSTEADINESS: 0
SHORTNESS OF BREATH: 1
FATIGUE: 1

## 2025-03-20 ASSESSMENT — PATIENT HEALTH QUESTIONNAIRE - PHQ9
2. FEELING DOWN, DEPRESSED OR HOPELESS: NOT AT ALL
SUM OF ALL RESPONSES TO PHQ9 QUESTIONS 1 AND 2: 0
1. LITTLE INTEREST OR PLEASURE IN DOING THINGS: NOT AT ALL

## 2025-03-20 NOTE — PROGRESS NOTES
"Subjective   Patient ID: Stan Carrero is a 84 y.o. male who presents for Follow-up.    HPI 84-year-old male here for follow-up diabetes type 2 hypertension hyperlipidemia coronary artery disease A-fib fib hypothyroidism    Review of Systems   Constitutional:  Positive for fatigue (Patient feels fatigued he has difficult time losing weight his TSH is still elevated on levothyroxine 25 mcg daily).   HENT:  Negative for congestion.    Eyes:  Negative for visual disturbance.   Respiratory:  Positive for shortness of breath.    Cardiovascular:  Negative for chest pain and leg swelling.   Gastrointestinal:  Negative for abdominal pain.   Genitourinary: Negative.    Musculoskeletal:  Positive for arthralgias.   Psychiatric/Behavioral:  Negative for dysphoric mood.      Blood work reviewed with patient    Objective   /80 (BP Location: Left arm, Patient Position: Sitting)   Pulse 65   Ht 1.854 m (6' 1\")   Wt 104 kg (229 lb)   SpO2 96%   BMI 30.21 kg/m²     Physical Exam  Neck:      Vascular: No carotid bruit.   Cardiovascular:      Rate and Rhythm: Rhythm irregular.      Pulses: Normal pulses.      Heart sounds: Murmur heard.   Pulmonary:      Breath sounds: Normal breath sounds.   Abdominal:      General: Bowel sounds are normal.      Palpations: Abdomen is soft.      Tenderness: There is no abdominal tenderness.   Musculoskeletal:      Right lower leg: No edema.      Left lower leg: No edema.   Neurological:      Mental Status: He is alert and oriented to person, place, and time.   Psychiatric:         Mood and Affect: Mood normal.         Assessment/Plan   Diagnoses and all orders for this visit:  Acquired hypothyroidism  - Your TSH is slightly elevated you have symptoms of hypothyroidism with fatigue difficulty losing weight I think bumping up your levothyroxine to the neck strength of 50 mcg daily will help you should get your thyroid checked in 3 months and follow-up with one of my colleagues  -     " levothyroxine (Synthroid, Levoxyl) 50 mcg tablet; Take 1 tablet (50 mcg) by mouth early in the morning.. Take on an empty stomach at the same time each day, either 30 to 60 minutes prior to breakfast  -     Tsh With Reflex To Free T4 If Abnormal; Future  Chronic systolic congestive heart failure  - This is under good control your lungs sound much better.  Atrial fibrillation, unspecified type (Multi)  - You are still in A-fib today  Type 2 diabetes mellitus without complication, without long-term current use of insulin (Multi)  - Your diabetes is under good control your A1c looks fine we will continue with same treatment for now

## 2025-03-25 ENCOUNTER — TELEPHONE (OUTPATIENT)
Dept: CARDIOLOGY | Facility: HOSPITAL | Age: 85
End: 2025-03-25
Payer: MEDICARE

## 2025-03-25 DIAGNOSIS — E78.00 PURE HYPERCHOLESTEROLEMIA: Primary | ICD-10-CM

## 2025-03-25 NOTE — TELEPHONE ENCOUNTER
I left voicemail for patient advising that I would like to discuss his lab results. I also informed him that I would send them to him via My Chart.

## 2025-03-26 NOTE — TELEPHONE ENCOUNTER
Per Dr. Long I provided the patient with the following results:  - elevated hemoglobin A1c mildly increased TSH.  Normal PSA.  Follow-up with primary physician.   - Lipids are not quite at LDL goal of less than 55.  Recommend adding ezetimibe 10 mg daily.  - Normal renal and hepatic function.    - Recheck a fasting lipid profile in 3 months.   Patient was agreeable and verbalized understanding.

## 2025-03-31 RX ORDER — EZETIMIBE 10 MG/1
10 TABLET ORAL DAILY
Qty: 90 TABLET | Refills: 3 | Status: SHIPPED | OUTPATIENT
Start: 2025-03-31 | End: 2026-03-31

## 2025-04-01 ENCOUNTER — APPOINTMENT (OUTPATIENT)
Dept: CARDIOLOGY | Facility: HOSPITAL | Age: 85
End: 2025-04-01
Payer: MEDICARE

## 2025-04-08 ENCOUNTER — OFFICE VISIT (OUTPATIENT)
Dept: CARDIOLOGY | Facility: HOSPITAL | Age: 85
End: 2025-04-08
Payer: MEDICARE

## 2025-04-08 ENCOUNTER — APPOINTMENT (OUTPATIENT)
Dept: PRIMARY CARE | Facility: CLINIC | Age: 85
End: 2025-04-08
Payer: MEDICARE

## 2025-04-08 VITALS
HEIGHT: 73 IN | OXYGEN SATURATION: 97 % | BODY MASS INDEX: 30.48 KG/M2 | HEART RATE: 61 BPM | DIASTOLIC BLOOD PRESSURE: 70 MMHG | SYSTOLIC BLOOD PRESSURE: 123 MMHG | WEIGHT: 230 LBS

## 2025-04-08 DIAGNOSIS — I25.10 CAD IN NATIVE ARTERY: Primary | ICD-10-CM

## 2025-04-08 DIAGNOSIS — E78.00 PURE HYPERCHOLESTEROLEMIA: ICD-10-CM

## 2025-04-08 DIAGNOSIS — I10 PRIMARY HYPERTENSION: ICD-10-CM

## 2025-04-08 DIAGNOSIS — N18.2 TYPE 2 DIABETES MELLITUS WITH STAGE 2 CHRONIC KIDNEY DISEASE, WITHOUT LONG-TERM CURRENT USE OF INSULIN (MULTI): ICD-10-CM

## 2025-04-08 DIAGNOSIS — E11.22 TYPE 2 DIABETES MELLITUS WITH STAGE 2 CHRONIC KIDNEY DISEASE, WITHOUT LONG-TERM CURRENT USE OF INSULIN (MULTI): ICD-10-CM

## 2025-04-08 DIAGNOSIS — I48.91 ATRIAL FIBRILLATION, UNSPECIFIED TYPE (MULTI): ICD-10-CM

## 2025-04-08 DIAGNOSIS — I50.32 CHRONIC DIASTOLIC CONGESTIVE HEART FAILURE: ICD-10-CM

## 2025-04-08 PROCEDURE — 1123F ACP DISCUSS/DSCN MKR DOCD: CPT | Performed by: INTERNAL MEDICINE

## 2025-04-08 PROCEDURE — 1159F MED LIST DOCD IN RCRD: CPT | Performed by: INTERNAL MEDICINE

## 2025-04-08 PROCEDURE — 1160F RVW MEDS BY RX/DR IN RCRD: CPT | Performed by: INTERNAL MEDICINE

## 2025-04-08 PROCEDURE — 1036F TOBACCO NON-USER: CPT | Performed by: INTERNAL MEDICINE

## 2025-04-08 PROCEDURE — 99214 OFFICE O/P EST MOD 30 MIN: CPT | Performed by: INTERNAL MEDICINE

## 2025-04-08 PROCEDURE — 3078F DIAST BP <80 MM HG: CPT | Performed by: INTERNAL MEDICINE

## 2025-04-08 PROCEDURE — 93005 ELECTROCARDIOGRAM TRACING: CPT | Performed by: INTERNAL MEDICINE

## 2025-04-08 PROCEDURE — 3074F SYST BP LT 130 MM HG: CPT | Performed by: INTERNAL MEDICINE

## 2025-04-08 PROCEDURE — G2211 COMPLEX E/M VISIT ADD ON: HCPCS | Performed by: INTERNAL MEDICINE

## 2025-04-08 RX ORDER — FUROSEMIDE 40 MG/1
40 TABLET ORAL DAILY
Qty: 90 TABLET | Refills: 3 | Status: SHIPPED | OUTPATIENT
Start: 2025-04-08 | End: 2025-04-09 | Stop reason: SDUPTHER

## 2025-04-08 RX ORDER — EZETIMIBE 10 MG/1
10 TABLET ORAL DAILY
Qty: 90 TABLET | Refills: 3 | Status: SHIPPED | OUTPATIENT
Start: 2025-04-08 | End: 2026-04-08

## 2025-04-08 RX ORDER — LOSARTAN POTASSIUM 25 MG/1
25 TABLET ORAL DAILY
Qty: 90 TABLET | Refills: 3 | Status: SHIPPED | OUTPATIENT
Start: 2025-04-08 | End: 2026-04-08

## 2025-04-08 RX ORDER — POTASSIUM CHLORIDE 20 MEQ/1
20 TABLET, EXTENDED RELEASE ORAL DAILY
Qty: 90 TABLET | Refills: 3 | Status: SHIPPED | OUTPATIENT
Start: 2025-04-08 | End: 2026-04-08

## 2025-04-08 RX ORDER — METOPROLOL TARTRATE 50 MG/1
50 TABLET ORAL 2 TIMES DAILY
Qty: 180 TABLET | Refills: 3 | Status: SHIPPED | OUTPATIENT
Start: 2025-04-08 | End: 2026-04-08

## 2025-04-08 RX ORDER — ATORVASTATIN CALCIUM 80 MG/1
80 TABLET, FILM COATED ORAL NIGHTLY
Qty: 90 TABLET | Refills: 3 | Status: SHIPPED | OUTPATIENT
Start: 2025-04-08 | End: 2025-04-10 | Stop reason: SDUPTHER

## 2025-04-08 NOTE — PROGRESS NOTES
Primary Care Physician: Mateo Chowdary MD  Date of Visit: 04/08/2025  1:00 PM EDT  Location of visit: [facility]     Chief Complaint:   Chief Complaint   Patient presents with    Follow-up        HPI / Summary:   Stan Carrero is a 84 y.o. male who presents for followup.  He has no new complaints.  He has no change in his chronic mild dyspnea on exertion with prolonged walking.  He is able to walk up and down stairs without chest pain or shortness of breath.  He did have an episode of dizziness last week that felt like he was off balance.  He did not have any falls or near syncope.  He does note occasional palpitations.  He has more chronic intermittent lower extremity edema that has not changed.  The patient denies chest pain, palpitations, lightheadedness, syncope, orthopnea, paroxysmal nocturnal dyspnea, or bleeding problems.      Past Medical History:   Diagnosis Date    Chronic sinusitis, unspecified     Chronic sinusitis    Other conditions influencing health status     Absence Of One Kidney    Personal history of other diseases of urinary system     History of pyelonephritis        Past Surgical History:   Procedure Laterality Date    APPENDECTOMY  06/17/2013    Appendectomy    CATARACT EXTRACTION  09/04/2013    Cataract Surgery    OTHER SURGICAL HISTORY  09/04/2013    Previous Stent Placement          Social History:   reports that he has never smoked. He has never used smokeless tobacco. He reports that he does not drink alcohol and does not use drugs.     Family History:  family history includes Alzheimer's disease in his mother; COPD in his father.      Allergies:  Allergies   Allergen Reactions    Lisinopril Rash    Shellfish Derived Rash       Outpatient Medications:  Current Outpatient Medications   Medication Instructions    acetaminophen (TYLENOL 8 HOUR) 1,300 mg, Every 8 hours PRN    adapalene (Differin) 0.1 % cream Topical, Daily RT    apixaban (ELIQUIS) 5 mg, oral, 2 times daily     "aspirin 81 mg, oral, Daily    atorvastatin (LIPITOR) 80 mg, oral, Nightly    azelastine (Astelin) 137 mcg (0.1 %) nasal spray 2 sprays, Each Nostril, 2 times daily    carboxymethylcellulose PF 1 % ophthalmic solution Administer into affected eye(s).    cetirizine (ZyrTEC) 10 mg tablet 1 tablet, Daily    clindamycin (Cleocin T) 1 % lotion APPLY SPARINGLY AND MASSAGE IN AS DIRECTED    clobetasol (Temovate) 0.05 % cream Topical, 2 times daily    clotrimazole-betamethasone (Lotrisone) cream APPLY TOPICALLY TWO TIMES ADAY AS DIRECTED    coenzyme Q10 400 mg, Daily    dapagliflozin propanediol (FARXIGA) 5 mg, oral, Daily    desonide (DesOwen) 0.05 % cream Topical, 2 times daily    ezetimibe (ZETIA) 10 mg, oral, Daily    furosemide (LASIX) 40 mg, oral, Daily    levothyroxine (SYNTHROID, LEVOXYL) 50 mcg, oral, Daily, Take on an empty stomach at the same time each day, either 30 to 60 minutes prior to breakfast    losartan (COZAAR) 25 mg, oral, Daily    metFORMIN XR (GLUCOPHAGE-XR) 500 mg, oral, Daily with evening meal    metoprolol tartrate (LOPRESSOR) 50 mg, oral, 2 times daily    neomycin-polymyxin-HC (Cortisporin) otic solution 3 drops, Each Ear, 4 times daily    nitroglycerin (NITROSTAT) 0.4 mg, sublingual, Every 5 min PRN    potassium chloride CR 20 mEq ER tablet 20 mEq, oral, Daily    tamsulosin (FLOMAX) 0.4 mg, oral, Daily    triamcinolone (Kenalog) 0.5 % cream USE AS DIRECTED    vitamins A,C,E-zinc-copper (PreserVision AREDS) 2,148 mcg-113 mg-45 mg-17.4mg tablet 1 tablet, 2 times daily       Physical Exam:  Vitals:    04/08/25 1259   BP: 123/70   BP Location: Right arm   Pulse: 61   SpO2: 97%   Weight: 104 kg (230 lb)   Height: 1.854 m (6' 1\")     Wt Readings from Last 5 Encounters:   04/08/25 104 kg (230 lb)   03/20/25 104 kg (229 lb)   10/01/24 108 kg (237 lb 12.8 oz)   04/04/24 107 kg (236 lb)   04/02/24 107 kg (236 lb 9.6 oz)     Body mass index is 30.34 kg/m².   General: Well-developed well-nourished in no acute " distress  HEENT: Normocephalic atraumatic  Neck: Supple, JVP is normal negative hepatojugular reflux 2+ carotid pulses without bruit  Pulmonary: Normal respiratory effort, clear to auscultation  Cardiovascular: No right ventricular heave, normal S1 and S2, no murmurs rubs or gallops  Abdomen: Soft nontender nondistended  Extremities: Warm trace bilateral ankle edema 2+ radial pulses bilaterally   Neurologic: Alert and oriented x3  Psychiatric: Normal mood and affect     Last Labs:  CMP:  Recent Labs     03/14/25  1009 03/14/25  1007 09/27/24  1116    142 141   K 4.4 4.1 3.9    104 102   CO2 28 27 30   ANIONGAP 11 11 13   BUN 26* 26* 17   CREATININE 1.02 1.03 1.11   EGFR 73 72 66   GLUCOSE 141* 138* 124*     Recent Labs     03/14/25  1009 03/14/25  1007 09/27/24  1116   ALBUMIN 4.1 4.1 4.1   ALKPHOS 85 80 88   ALT 20 21 21   AST 14 15 16   BILITOT 0.6 0.6 1.1     CBC:  Recent Labs     09/27/24  1116 03/29/24  1026 02/06/24  1424   WBC 6.4 5.2 5.8   HGB 15.8 15.7 15.7   HCT 48.4 47.3 47.8    260 253   MCV 98 98 98     COAG:   Recent Labs     12/13/19  0653   INR 1.3*     HEME/ENDO:  Recent Labs     03/14/25  1009 03/14/25  1007 09/27/24  1116   TSH 5.29* 5.38* 3.95   HGBA1C 7.4* 7.5* 6.8*      CARDIAC:   Recent Labs     03/29/24  1026 02/06/24  1424   TROPHS 5 6     Recent Labs     03/14/25  1009 03/14/25  1007 09/27/24  1116 03/29/24  1026 09/29/23  1310 09/30/22  1210 04/20/22  1253   CHOL 139 141 130   < > 135 136 138   LDLF  --   --   --   --  54 53 56   LDLCALC 60 60 44   < >  --   --   --    HDL 54 56 44.7   < > 50.5 47.0 51.7   TRIG 167* 169* 208*   < > 153* 179* 153*    < > = values in this interval not displayed.       Last Cardiology Tests:  ECG:  An electrocardiogram performed today that I reviewed shows atrial fibrillation possible prior anterior infarct pattern.    Echo:  Echocardiogram from October 2, 2020  CONCLUSIONS:   1. The left ventricular systolic function is normal with a  55-60% estimated ejection fraction.   2. Spectral Doppler shows an abnormal pattern of left ventricular diastolic filling.   3. There is mild to moderate tricuspid regurgitation.   4. The patient is in atrial fibrillation which may influence the estimate of left ventricular function and transvalvular flows.       Cath:  Cardiac catheterization October 14, 2015  Coronary Lesion Summary:  Vessel       Stenosis                Vessel Segment            Length (mm)  LAD          50% in-stent restenosis proximal to mid  LAD          85% stenosis            proximal third of the mid 10  1st Diagonal 70% stenosis            ostial  OM 1         40% stenosis            proximal  RCA          50% stenosis            proximal to mid   CONCLUSIONS:   1. Severe single vessel CAD of the LAD in a right dominant system.   2. Patent proximal LAD stent with diffuse ISR in the mid to distal stent.   3. Successful OCT guided PCI of the proximal to mid LAD with a 3.0 x 32mm Promus Premiere IVAN postdilated from 3.25 to 3.5mm.   4. Elevated LVEDP.   5. No aortic stenosis.    Stress Test:  Cardiac MRI stress May 23, 2024:  Impression   1. Normal LV size and systolic function. Quantitative LVEF 55 %. Poor  quality Imaging, likely underestimating LVEF  2. No evidence of inducible myocardial ischemia using regadenoson  stress perfusion imaging.  3. No evidence of myocardial fibrosis, infiltration or infarction  based on LGE imaging.  4. Normal RV size and systolic function. Quantitative RVEF66%.  5. Dilated aortic root with trivial AR (regurgitation fraction = 5%).  6. The central pulmonary arteries appear dilated correlate with  elevated right-sided filling pressures.            Cardiac Imaging:  CT chest without contrast February 2024  IMPRESSION:  1.  No acute thoracic findings.  2.  Atherosclerosis of the thoracic aorta and coronary arteries is  present.  Mild cardiomegaly noted.      Assessment/Plan   Diagnoses and all orders for this  visit:  CAD in native artery  -     Referral to [facility]; Future  Atrial fibrillation, unspecified type (Multi)  -     ECG 12 lead (Clinic Performed)  -     apixaban (Eliquis) 5 mg tablet; Take 1 tablet (5 mg) by mouth 2 times a day.  Pure hypercholesterolemia  -     ezetimibe (Zetia) 10 mg tablet; Take 1 tablet (10 mg) by mouth once daily.  -     atorvastatin (Lipitor) 80 mg tablet; Take 1 tablet (80 mg) by mouth once daily at bedtime.  Chronic diastolic congestive heart failure  -     potassium chloride CR 20 mEq ER tablet; Take 1 tablet (20 mEq) by mouth once daily.  -     furosemide (Lasix) 40 mg tablet; Take 1 tablet (40 mg) by mouth once daily.  Primary hypertension  -     potassium chloride CR 20 mEq ER tablet; Take 1 tablet (20 mEq) by mouth once daily.  -     losartan (Cozaar) 25 mg tablet; Take 1 tablet (25 mg) by mouth once daily.  -     metoprolol tartrate (Lopressor) 50 mg tablet; Take 1 tablet by mouth 2 times a day.  -     furosemide (Lasix) 40 mg tablet; Take 1 tablet (40 mg) by mouth once daily.  Type 2 diabetes mellitus with stage 2 chronic kidney disease, without long-term current use of insulin (Multi)  -     Referral to [facility]; Future    In summary Mr. Carrero is a pleasant 84 year-old white male with a past medical history significant for coronary artery disease status post bare-metal stent placement to his LAD in 2001 and subsequent drug-eluting stent placement to his LAD in 2015 with preserved left ventricular function, hypertension, hyperlipidemia, and type II non-insulin-requiring diabetes, persistent atrial fibrillation on anticoagulation, mildly dilated aortic root on MRI, chronic diastolic heart failure, and frequent PVCs with nonsustained VT, and history of prior diverticular GI bleed.  He is relatively asymptomatic from a cardiac perspective.  He is euvolemic on exam.  His blood pressure is controlled.  His most recent lipid profile was not at goal.  He started ezetimibe this  week.  We will repeat a fasting lipid profile in 3 months.  I did place the [facility] to assist with management of his diabetes.  He should continue his other cardiovascular medications.  We will see him back in follow-up in 6 months    Orders:  Orders Placed This Encounter   Procedures    [facility]    ECG 12 lead (Clinic Performed)      Followup Appts:  Future Appointments   Date Time Provider Department Center   10/7/2025 10:20 AM Dillon Pereira MD [id number] [facility]   10/7/2025  1:00 PM Stan Long MD [id number] [facility]           ____________________________________________________________  Stan Long MD  [facility] [place]  [facility]

## 2025-04-08 NOTE — PATIENT INSTRUCTIONS
Check fasting lipid profile in 3 months.  [facility] clinic referral.  Follow up in 6 months.

## 2025-04-09 DIAGNOSIS — R35.1 BPH ASSOCIATED WITH NOCTURIA: ICD-10-CM

## 2025-04-09 DIAGNOSIS — N40.1 BPH ASSOCIATED WITH NOCTURIA: ICD-10-CM

## 2025-04-09 DIAGNOSIS — L30.9 ECZEMA, UNSPECIFIED TYPE: ICD-10-CM

## 2025-04-09 DIAGNOSIS — N18.2 TYPE 2 DIABETES MELLITUS WITH STAGE 2 CHRONIC KIDNEY DISEASE, WITHOUT LONG-TERM CURRENT USE OF INSULIN (MULTI): ICD-10-CM

## 2025-04-09 DIAGNOSIS — J30.9 ALLERGIC RHINITIS, UNSPECIFIED SEASONALITY, UNSPECIFIED TRIGGER: ICD-10-CM

## 2025-04-09 DIAGNOSIS — I10 PRIMARY HYPERTENSION: ICD-10-CM

## 2025-04-09 DIAGNOSIS — E11.22 TYPE 2 DIABETES MELLITUS WITH STAGE 2 CHRONIC KIDNEY DISEASE, WITHOUT LONG-TERM CURRENT USE OF INSULIN (MULTI): ICD-10-CM

## 2025-04-09 DIAGNOSIS — I50.32 CHRONIC DIASTOLIC CONGESTIVE HEART FAILURE: ICD-10-CM

## 2025-04-09 LAB
ATRIAL RATE: 58 BPM
Q ONSET: 223 MS
QRS COUNT: 10 BEATS
QRS DURATION: 86 MS
QT INTERVAL: 436 MS
QTC CALCULATION(BAZETT): 438 MS
QTC FREDERICIA: 438 MS
R AXIS: 40 DEGREES
T AXIS: 58 DEGREES
T OFFSET: 441 MS
VENTRICULAR RATE: 61 BPM

## 2025-04-09 RX ORDER — METFORMIN HYDROCHLORIDE 500 MG/1
500 TABLET, EXTENDED RELEASE ORAL
Qty: 90 TABLET | Refills: 3 | Status: SHIPPED | OUTPATIENT
Start: 2025-04-09 | End: 2025-04-10 | Stop reason: SDUPTHER

## 2025-04-09 RX ORDER — NEOMYCIN SULFATE, POLYMYXIN B SULFATE, HYDROCORTISONE 3.5; 10000; 1 MG/ML; [USP'U]/ML; MG/ML
3 SOLUTION/ DROPS AURICULAR (OTIC) 4 TIMES DAILY
Qty: 30 ML | Refills: 3 | Status: SHIPPED | OUTPATIENT
Start: 2025-04-09 | End: 2025-04-10 | Stop reason: SDUPTHER

## 2025-04-09 RX ORDER — DESONIDE 0.5 MG/G
CREAM TOPICAL 2 TIMES DAILY
Qty: 180 G | Refills: 3 | Status: SHIPPED | OUTPATIENT
Start: 2025-04-09 | End: 2025-04-10 | Stop reason: SDUPTHER

## 2025-04-09 RX ORDER — DAPAGLIFLOZIN 5 MG/1
5 TABLET, FILM COATED ORAL DAILY
Qty: 90 TABLET | Refills: 3 | Status: SHIPPED | OUTPATIENT
Start: 2025-04-09 | End: 2025-04-10 | Stop reason: SDUPTHER

## 2025-04-09 RX ORDER — ATENOLOL 50 MG/1
TABLET ORAL
Refills: 0 | OUTPATIENT
Start: 2025-04-09

## 2025-04-09 RX ORDER — TAMSULOSIN HYDROCHLORIDE 0.4 MG/1
0.4 CAPSULE ORAL DAILY
Qty: 90 CAPSULE | Refills: 3 | Status: SHIPPED | OUTPATIENT
Start: 2025-04-09 | End: 2025-04-10 | Stop reason: SDUPTHER

## 2025-04-09 RX ORDER — FUROSEMIDE 40 MG/1
40 TABLET ORAL DAILY
Qty: 90 TABLET | Refills: 3 | Status: SHIPPED | OUTPATIENT
Start: 2025-04-09 | End: 2025-04-10 | Stop reason: SDUPTHER

## 2025-04-09 RX ORDER — TRIAMCINOLONE ACETONIDE 5 MG/G
CREAM TOPICAL
Qty: 150 G | Refills: 3 | Status: SHIPPED | OUTPATIENT
Start: 2025-04-09 | End: 2025-04-10 | Stop reason: SDUPTHER

## 2025-04-10 DIAGNOSIS — L30.9 ECZEMA, UNSPECIFIED TYPE: ICD-10-CM

## 2025-04-10 DIAGNOSIS — N18.2 TYPE 2 DIABETES MELLITUS WITH STAGE 2 CHRONIC KIDNEY DISEASE, WITHOUT LONG-TERM CURRENT USE OF INSULIN (MULTI): ICD-10-CM

## 2025-04-10 DIAGNOSIS — I50.32 CHRONIC DIASTOLIC CONGESTIVE HEART FAILURE: ICD-10-CM

## 2025-04-10 DIAGNOSIS — N40.1 BPH ASSOCIATED WITH NOCTURIA: ICD-10-CM

## 2025-04-10 DIAGNOSIS — R35.1 BPH ASSOCIATED WITH NOCTURIA: ICD-10-CM

## 2025-04-10 DIAGNOSIS — I10 PRIMARY HYPERTENSION: ICD-10-CM

## 2025-04-10 DIAGNOSIS — E11.22 TYPE 2 DIABETES MELLITUS WITH STAGE 2 CHRONIC KIDNEY DISEASE, WITHOUT LONG-TERM CURRENT USE OF INSULIN (MULTI): ICD-10-CM

## 2025-04-10 DIAGNOSIS — J30.9 ALLERGIC RHINITIS, UNSPECIFIED SEASONALITY, UNSPECIFIED TRIGGER: ICD-10-CM

## 2025-04-10 DIAGNOSIS — E78.00 PURE HYPERCHOLESTEROLEMIA: ICD-10-CM

## 2025-04-10 RX ORDER — ATORVASTATIN CALCIUM 80 MG/1
80 TABLET, FILM COATED ORAL NIGHTLY
Qty: 90 TABLET | Refills: 3 | Status: SHIPPED | OUTPATIENT
Start: 2025-04-10 | End: 2026-04-10

## 2025-04-10 RX ORDER — NEOMYCIN SULFATE, POLYMYXIN B SULFATE, HYDROCORTISONE 3.5; 10000; 1 MG/ML; [USP'U]/ML; MG/ML
3 SOLUTION/ DROPS AURICULAR (OTIC) 4 TIMES DAILY
Qty: 30 ML | Refills: 3 | Status: SHIPPED | OUTPATIENT
Start: 2025-04-10

## 2025-04-10 RX ORDER — TAMSULOSIN HYDROCHLORIDE 0.4 MG/1
0.4 CAPSULE ORAL DAILY
Qty: 90 CAPSULE | Refills: 3 | Status: SHIPPED | OUTPATIENT
Start: 2025-04-10

## 2025-04-10 RX ORDER — AZELASTINE 1 MG/ML
2 SPRAY, METERED NASAL 2 TIMES DAILY
Qty: 90 ML | Refills: 3 | Status: SHIPPED | OUTPATIENT
Start: 2025-04-10

## 2025-04-10 RX ORDER — FUROSEMIDE 40 MG/1
40 TABLET ORAL DAILY
Qty: 90 TABLET | Refills: 3 | Status: SHIPPED | OUTPATIENT
Start: 2025-04-10 | End: 2026-04-10

## 2025-04-10 RX ORDER — DESONIDE 0.5 MG/G
CREAM TOPICAL 2 TIMES DAILY
Qty: 180 G | Refills: 3 | Status: SHIPPED | OUTPATIENT
Start: 2025-04-10

## 2025-04-10 RX ORDER — TRIAMCINOLONE ACETONIDE 5 MG/G
CREAM TOPICAL
Qty: 150 G | Refills: 3 | Status: SHIPPED | OUTPATIENT
Start: 2025-04-10

## 2025-04-10 RX ORDER — DAPAGLIFLOZIN 5 MG/1
5 TABLET, FILM COATED ORAL DAILY
Qty: 90 TABLET | Refills: 3 | Status: SHIPPED | OUTPATIENT
Start: 2025-04-10

## 2025-04-10 RX ORDER — METFORMIN HYDROCHLORIDE 500 MG/1
500 TABLET, EXTENDED RELEASE ORAL
Qty: 90 TABLET | Refills: 3 | Status: SHIPPED | OUTPATIENT
Start: 2025-04-10

## 2025-06-20 DIAGNOSIS — E03.9 ACQUIRED HYPOTHYROIDISM: ICD-10-CM

## 2025-06-28 LAB
CHOLEST SERPL-MCNC: 116 MG/DL
CHOLEST/HDLC SERPL: 2.3 (CALC)
HDLC SERPL-MCNC: 51 MG/DL
LDLC SERPL CALC-MCNC: 41 MG/DL (CALC)
NONHDLC SERPL-MCNC: 65 MG/DL (CALC)
TRIGL SERPL-MCNC: 165 MG/DL
TSH SERPL-ACNC: 3.4 MIU/L (ref 0.4–4.5)

## 2025-10-07 ENCOUNTER — APPOINTMENT (OUTPATIENT)
Dept: PRIMARY CARE | Facility: CLINIC | Age: 85
End: 2025-10-07
Payer: MEDICARE

## 2025-10-07 ENCOUNTER — APPOINTMENT (OUTPATIENT)
Dept: CARDIOLOGY | Facility: HOSPITAL | Age: 85
End: 2025-10-07
Payer: MEDICARE

## 2025-10-13 ENCOUNTER — APPOINTMENT (OUTPATIENT)
Dept: PRIMARY CARE | Facility: CLINIC | Age: 85
End: 2025-10-13
Payer: MEDICARE

## 2026-04-06 ENCOUNTER — APPOINTMENT (OUTPATIENT)
Dept: PRIMARY CARE | Facility: CLINIC | Age: 86
End: 2026-04-06
Payer: MEDICARE